# Patient Record
Sex: FEMALE | Race: WHITE | NOT HISPANIC OR LATINO | URBAN - METROPOLITAN AREA
[De-identification: names, ages, dates, MRNs, and addresses within clinical notes are randomized per-mention and may not be internally consistent; named-entity substitution may affect disease eponyms.]

---

## 2018-04-24 ENCOUNTER — TRANSFERRED RECORDS (OUTPATIENT)
Dept: HEALTH INFORMATION MANAGEMENT | Facility: CLINIC | Age: 33
End: 2018-04-24

## 2018-05-04 ENCOUNTER — TRANSFERRED RECORDS (OUTPATIENT)
Dept: HEALTH INFORMATION MANAGEMENT | Facility: CLINIC | Age: 33
End: 2018-05-04

## 2018-07-24 ENCOUNTER — TELEPHONE (OUTPATIENT)
Dept: DERMATOLOGY | Facility: CLINIC | Age: 33
End: 2018-07-24

## 2018-07-24 NOTE — TELEPHONE ENCOUNTER
Dermatology Pre-visit Call:    Reason for visit : HS of groin     Any personal history of skin cancers: No    Was the patient referred: Yes    If the patient was referred, are records obtained: No. (If no, then obtain records). Pt is calling outside clinic for records     Has the patient seen a dermatologist in the past: Yes. (If yes, obtain records)    Patient Reminders Given:  --Please, make sure you bring an updated list of your medications.   --Plan on being in our facility for approximately one hour, this includes the registration process, office visit, education and check-out process.  If you are having a procedure, more time may be required.     --If you are having a procedure, please, present 15 minutes early.   --Location reviewed.   --If you need to cancel or reschedule, call [phone]  --We look forward to seeing you in Dermatology Clinic.

## 2018-07-25 ENCOUNTER — HEALTH MAINTENANCE LETTER (OUTPATIENT)
Age: 33
End: 2018-07-25

## 2018-07-26 ENCOUNTER — TELEPHONE (OUTPATIENT)
Dept: DERMATOLOGY | Facility: CLINIC | Age: 33
End: 2018-07-26

## 2018-07-26 ENCOUNTER — OFFICE VISIT (OUTPATIENT)
Dept: DERMATOLOGY | Facility: CLINIC | Age: 33
End: 2018-07-26
Payer: COMMERCIAL

## 2018-07-26 DIAGNOSIS — L73.2 HIDRADENITIS SUPPURATIVA: Primary | ICD-10-CM

## 2018-07-26 DIAGNOSIS — L73.2 HIDRADENITIS SUPPURATIVA: ICD-10-CM

## 2018-07-26 DIAGNOSIS — Z79.899 ENCOUNTER FOR LONG-TERM (CURRENT) USE OF HIGH-RISK MEDICATION: ICD-10-CM

## 2018-07-26 LAB
ALBUMIN SERPL-MCNC: 3.7 G/DL (ref 3.4–5)
ALP SERPL-CCNC: 68 U/L (ref 40–150)
ALT SERPL W P-5'-P-CCNC: 28 U/L (ref 0–50)
ANION GAP SERPL CALCULATED.3IONS-SCNC: 6 MMOL/L (ref 3–14)
AST SERPL W P-5'-P-CCNC: 21 U/L (ref 0–45)
BILIRUB SERPL-MCNC: 0.3 MG/DL (ref 0.2–1.3)
BUN SERPL-MCNC: 8 MG/DL (ref 7–30)
CALCIUM SERPL-MCNC: 8.8 MG/DL (ref 8.5–10.1)
CHLORIDE SERPL-SCNC: 106 MMOL/L (ref 94–109)
CO2 SERPL-SCNC: 28 MMOL/L (ref 20–32)
CREAT SERPL-MCNC: 0.68 MG/DL (ref 0.52–1.04)
ERYTHROCYTE [DISTWIDTH] IN BLOOD BY AUTOMATED COUNT: 12.5 % (ref 10–15)
GFR SERPL CREATININE-BSD FRML MDRD: >90 ML/MIN/1.7M2
GLUCOSE SERPL-MCNC: 112 MG/DL (ref 70–99)
HCT VFR BLD AUTO: 41.7 % (ref 35–47)
HGB BLD-MCNC: 14.2 G/DL (ref 11.7–15.7)
MCH RBC QN AUTO: 31.8 PG (ref 26.5–33)
MCHC RBC AUTO-ENTMCNC: 34.1 G/DL (ref 31.5–36.5)
MCV RBC AUTO: 93 FL (ref 78–100)
PLATELET # BLD AUTO: 283 10E9/L (ref 150–450)
POTASSIUM SERPL-SCNC: 4.3 MMOL/L (ref 3.4–5.3)
PROT SERPL-MCNC: 7.7 G/DL (ref 6.8–8.8)
RBC # BLD AUTO: 4.47 10E12/L (ref 3.8–5.2)
RETICS # AUTO: 64.4 10E9/L (ref 25–95)
RETICS/RBC NFR AUTO: 1.4 % (ref 0.5–2)
SODIUM SERPL-SCNC: 138 MMOL/L (ref 133–144)
WBC # BLD AUTO: 5.8 10E9/L (ref 4–11)

## 2018-07-26 RX ORDER — TRIAMCINOLONE ACETONIDE 40 MG/ML
40 INJECTION, SUSPENSION INTRA-ARTICULAR; INTRAMUSCULAR ONCE
Qty: 1 ML | Refills: 0 | OUTPATIENT
Start: 2018-07-26 | End: 2018-07-26

## 2018-07-26 RX ORDER — LEVOTHYROXINE SODIUM 100 UG/1
100 TABLET ORAL
COMMUNITY
Start: 2018-03-02 | End: 2023-11-03 | Stop reason: DRUGHIGH

## 2018-07-26 RX ORDER — NORGESTIMATE AND ETHINYL ESTRADIOL 7DAYSX3 28
0.04 KIT ORAL
COMMUNITY
Start: 2018-03-02

## 2018-07-26 RX ORDER — BENZOYL PEROXIDE 10 G/100G
SUSPENSION TOPICAL
Qty: 1 BOTTLE | Refills: 11 | Status: SHIPPED | OUTPATIENT
Start: 2018-07-26 | End: 2019-09-12

## 2018-07-26 RX ORDER — PHENTERMINE HYDROCHLORIDE 30 MG/1
30 CAPSULE ORAL
COMMUNITY
Start: 2018-03-02 | End: 2018-08-29

## 2018-07-26 RX ORDER — CLINDAMYCIN PHOSPHATE 10 MG/G
GEL TOPICAL 2 TIMES DAILY
Qty: 60 G | Refills: 11 | Status: SHIPPED | OUTPATIENT
Start: 2018-07-26 | End: 2023-11-03

## 2018-07-26 ASSESSMENT — PAIN SCALES - GENERAL
PAINLEVEL: NO PAIN (1)
PAINLEVEL: NO PAIN (0)

## 2018-07-26 NOTE — TELEPHONE ENCOUNTER
Health Call Center    Phone Message    May a detailed message be left on voicemail: yes    Reason for Call: Medication Question or concern regarding medication   Prescription Clarification  Name of Medication: benzoyl peroxide 10 % LIQD  Prescribing Provider: Dr Ventura   Pharmacy: Rosi from [facility], [address] [place] [id number], Ph # [phone], Fax # [phone]    What on the order needs clarification? How often should Pt use this medication?      Action Taken: Message routed to:  [facility] ([facility] Dermatology Clinic

## 2018-07-26 NOTE — MR AVS SNAPSHOT
After Visit Summary   7/26/2018    Sheri Pace    MRN: [id number]           Patient Information     Date Of Birth          1985        Visit Information        Provider Department      7/26/2018 1:30 PM Des Ventura MD [facility] Dermatology        Today's Diagnoses     Hidradenitis suppurativa    -  1    Encounter for long-term (current) use of high-risk medication          Care Instructions    Dapsone 100mg daily   If feeling short of breath and go to urgent care o emergency room remind them of methemoglobinemia     Benzoyl peroxide wash daily. Leave on for 3 min, then wash off  Clindamycin gel twice a day          Follow-ups after your visit        Follow-up notes from your care team     Return in about 8 weeks (around 9/20/2018).      Your next 10 appointments already scheduled     Jul 26, 2018  3:15 PM CDT   LAB with  LAB   [facility] ([facility])    [address] [place] [id number] [phone]           Please do not eat 10-12 hours before your appointment if you are coming in fasting for labs on lipids, cholesterol, or glucose (sugar). This does not apply to pregnant women. Water, hot tea and black coffee (with nothing added) are okay. Do not drink other fluids, diet soda or chew gum.            Oct 04, 2018  2:15 PM CDT   (Arrive by 2:00 PM)   Return Visit with Des Ventura MD   [facility] Dermatology ([facility])    [address] [place] [id number] [phone]              Who to contact     Please call your clinic at [phone] to:    Ask questions about your health    Make or cancel appointments    Discuss your medicines    Learn about your test results    Speak to your doctor            Additional Information About Your Visit        MyChart Information     [employer] gives you secure access to your electronic health record. If you see a primary care provider,  you can also send messages to your care team and make appointments. If you have questions, please call your primary care clinic.  If you do not have a primary care provider, please call [phone] and they will assist you.      [employer] is an electronic gateway that provides easy, online access to your medical records. With [employer], you can request a clinic appointment, read your test results, renew a prescription or communicate with your care team.     To access your existing account, please contact your [facility] or call [phone] for assistance.        Care EveryWhere ID     This is your Care EveryWhere ID. This could be used by other organizations to access your [place] medical records  [id number]         Blood Pressure from Last 3 Encounters:   No data found for BP    Weight from Last 3 Encounters:   No data found for Wt              Today, you had the following     No orders found for display       Primary Care Provider    None Specified       No primary provider on file.        Equal Access to Services     [facility]: Hadii titi Clemens, waparisda elizabet, hugo nicholasalmawalt spicer, smita tilley . So [facility] [phone].    ATENCIÓN: Si habla español, tiene a barfield disposición servicios gratuitos de asistencia lingüística. Llame al [phone].    We comply with applicable federal civil rights laws and [place] laws. We do not discriminate on the basis of race, color, national origin, age, disability, sex, sexual orientation, or gender identity.            Thank you!     Thank you for choosing [facility] DERMATOLOGY  for your care. Our goal is always to provide you with excellent care. Hearing back from our patients is one way we can continue to improve our services. Please take a few minutes to complete the written survey that you may receive in the mail after your visit with us. Thank you!             Your Updated Medication List -  Protect others around you: Learn how to safely use, store and throw away your medicines at www.disposemymeds.org.          This list is accurate as of 7/26/18  3:12 PM.  Always use your most recent med list.                   Brand Name Dispense Instructions for use Diagnosis    levothyroxine 100 MCG tablet    SYNTHROID/LEVOTHROID     100 mcg        norgestim-eth estrad triphasic 0.18/0.215/0.25 MG-35 MCG per tablet    ORTHO TRI-CYCLEN, TRI-SPRINTEC     0.035 mg        phentermine 30 MG capsule      30 mg

## 2018-07-26 NOTE — PROGRESS NOTES
[facility] Dermatology Note    Dermatology Problem List:  1.Hidradenitis suppurativa of the bilateral inguinal folds, mild  -ILK 2 ml of 20mg/cc 7/26/18  -dapsone 100 mg daily, BPO 10% wash, clindamycin 1% gel  -CBC, CMP, G6PD, retic count checked 7/26/18    Encounter Date: Jul 26, 2018    CC:  Chief Complaint   Patient presents with     Derm Problem     Sheri is here today for a new HS in her groin area.     History of Present Illness:  Ms. Sheri Pace is a 32 year old female from [place] who presents for evaluation of hidradenitis suppurativa. Sheri has a longstanding history of HS since the age of 15 which was initially on the axillary folds and then progressed to her inguinal folds a year later. In 2012 she had surgical excision of the hidradenitis of the left axilla, bilateral thighs and groin, left labia majora and left buttock. The axillary regions have healed without recurrence of HS in these areas. She now presents with worsening of her HS in the groin. She has persistent drainage of these regions and changes the gauze covering these areas twice daily. She also complains of discomfort and pain, exacerbated with movement. She was previously on minocycline 100 mg BID since March however she decided to stop taking this approximately one month ago as she was not seeing any improvement in her HS. She has also previously tried using disinfectant washes, however she is not sure of which one. She takes phentermine 30 mg daily for weight loss but has not noticed a significant change in weight. She also takes Tri-Sprintec OCP. She is a prior smoker, quit in 2014.     Past Medical History:   HS    Social History:  The patient works as a  at a [employer].     Family History:  Denies any family hx of HS.     Medications:  Current Outpatient Prescriptions   Medication Sig Dispense Refill     levothyroxine (SYNTHROID/LEVOTHROID) 100 MCG tablet 100 mcg       norgestim-eth estrad  triphasic (ORTHO TRI-CYCLEN, TRI-SPRINTEC) 0.18/0.215/0.25 MG-35 MCG per tablet 0.035 mg       phentermine 30 MG capsule 30 mg       Allergies   Allergen Reactions     Penicillin G Rash     Review of Systems:  -As per HPI, Skin/Heme New Pt: The patient denies frequent sun exposure. The patient denies excessive scarring or problems healing except as per HPI. The patient denies excessive bleeding.  -Constitutional: The patient denies fatigue, fevers, chills, unintended weight loss, and night sweats.  -HEENT: Patient denies nonhealing oral sores.  -Skin: As above in HPI. No additional skin concerns.    Physical exam:  GEN: This is a well developed, well-nourished female in no acute distress, in a pleasant mood.    SKIN: Total skin excluding the undergarment areas was performed. The exam included the head/face, neck, both arms, chest, back, abdomen, both legs, digits and/or nails.   -1.8 cm violaceous plaque in the right superior inguinal fold with 6 violaceous papules in lower right inguinal fold  - 1.4 cm violaceous plaque in the left inguinal fold with 2 violaceous draining papules  -No other lesions of concern on areas examined.     Impression/Plan:  1. Hidradenitis suppurativa, bilateral inguinal folds, mild: We discussed the natural history, epidemiology and pathogenesis of HS with Sheri today. We explained that this is not an infectious process but rather a pathology of the hair follicle. We also explained that there may be a hormonal relation with HS that causes mauricio-menstrual or peripartum flares. Various treatment options and their risks and benefits for HS including anti-inflammatories, immunosuppressants and antibiotics were presented to Sheri today. Lifestyle changes including special diets, weight loss, curcumin and zinc supplementation (we did tell her that she cannot take this continuously for more than 3 months without a doctor's supervision) were also discussed. She has not seen any change with  antibiotic use in the past and would like to try a different treatment approach. We have agreed to begin dapsone therapy with ILK injections in clinic today.    Kenalog intralesional injection procedure note (performed by faculty): After verbal consent and discussion of risks including but not limited to atrophy, pain, and bruising,  time out was performed, 2 total cc of Kenalog 20 mg/cc was injected into 10 sites on the bilateral inguinal folds.  The patient tolerated the procedure well and left the Dermatology clinic in good condition.    Begin dapsone 100 mg daily. Will check CBC, CMP, G6PD and reticulocyte count today. Patient will have q2week labs checked in [place] with PCP. Patient was informed of adverse affects of dapsone including neuropathy, anemia and methemoglobinemia. She understands she should go to an ER if she starts to experience SOB or other symptoms of methemoglobinemia.     Begin benzoyl peroxide 10% wash daily.    Begin clindamycin 1% gel BID    Follow-up in 4 weeks, earlier for new or changing lesions.     Staff Involved:  Scribed by Klaudia Cash MS4 for Dr. Ventura.      Provider Disclosure:   The documentation recorded by the scribe accurately reflects the services I personally performed and the decisions made by me.    Des Ventura MD, FAAD    Departments of Internal Medicine and Dermatology  [facility]  [phone]

## 2018-07-26 NOTE — LETTER
Date:August 15, 2018      Patient was self referred, no letter generated. Do not send.        [facility] Information

## 2018-07-26 NOTE — NURSING NOTE
Drug Administration Record    Drug Name: triamcinolone acetonide(kenalog)  Dose: 2mL of triamcinolone 20mg/mL, 40mg dose  Route administered: ID  NDC #: Kenalog-40 ([address]-5)   Amount of waste(mL):0  Reason for waste: Single use vial    LOT #: [id number]  SITE: body  : [employer]  EXPIRATION DATE: 01/2020

## 2018-07-26 NOTE — NURSING NOTE
Dermatology Rooming Note    Sheri Pace's goals for this visit include:   Chief Complaint   Patient presents with     Derm Problem     Sheri is here today for a new HS in her groin area.

## 2018-07-26 NOTE — LETTER
7/26/2018       RE: Sheri Pace  [address] [place] [address]     Dear Colleague,    Thank you for referring your patient, Sheri Pace, to the [facility] DERMATOLOG[facility] at [facility]. Please see a copy of my visit note below.    [facility] Dermatology Note    Dermatology Problem List:  1.Hidradenitis suppurativa of the bilateral inguinal folds, mild  -ILK 2 ml of 20mg/cc 7/26/18  -dapsone 100 mg daily, BPO 10% wash, clindamycin 1% gel  -CBC, CMP, G6PD, retic count checked 7/26/18    Encounter Date: Jul 26, 2018    CC:  Chief Complaint   Patient presents with     Derm Problem     Sheri is here today for a new HS in her groin area.     History of Present Illness:  Ms. Sheri Pace is a 32 year old female from [place] who presents for evaluation of hidradenitis suppurativa. Sheri has a longstanding history of HS since the age of 15 which was initially on the axillary folds and then progressed to her inguinal folds a year later. In 2012 she had surgical excision of the hidradenitis of the left axilla, bilateral thighs and groin, left labia majora and left buttock. The axillary regions have healed without recurrence of HS in these areas. She now presents with worsening of her HS in the groin. She has persistent drainage of these regions and changes the gauze covering these areas twice daily. She also complains of discomfort and pain, exacerbated with movement. She was previously on minocycline 100 mg BID since March however she decided to stop taking this approximately one month ago as she was not seeing any improvement in her HS. She has also previously tried using disinfectant washes, however she is not sure of which one. She takes phentermine 30 mg daily for weight loss but has not noticed a significant change in weight. She also takes Tri-Sprintec OCP. She is a prior smoker, quit in 2014.     Past Medical History:   HS    Social History:  The patient  works as a  at a [employer].     Family History:  Denies any family hx of HS.     Medications:  Current Outpatient Prescriptions   Medication Sig Dispense Refill     levothyroxine (SYNTHROID/LEVOTHROID) 100 MCG tablet 100 mcg       norgestim-eth estrad triphasic (ORTHO TRI-CYCLEN, TRI-SPRINTEC) 0.18/0.215/0.25 MG-35 MCG per tablet 0.035 mg       phentermine 30 MG capsule 30 mg       Allergies   Allergen Reactions     Penicillin G Rash     Review of Systems:  -As per HPI, Skin/Heme New Pt: The patient denies frequent sun exposure. The patient denies excessive scarring or problems healing except as per HPI. The patient denies excessive bleeding.  -Constitutional: The patient denies fatigue, fevers, chills, unintended weight loss, and night sweats.  -HEENT: Patient denies nonhealing oral sores.  -Skin: As above in HPI. No additional skin concerns.    Physical exam:  GEN: This is a well developed, well-nourished female in no acute distress, in a pleasant mood.    SKIN: Total skin excluding the undergarment areas was performed. The exam included the head/face, neck, both arms, chest, back, abdomen, both legs, digits and/or nails.   -1.8 cm violaceous plaque in the right superior inguinal fold with 6 violaceous papules in lower right inguinal fold  - 1.4 cm violaceous plaque in the left inguinal fold with 2 violaceous draining papules  -No other lesions of concern on areas examined.     Impression/Plan:  1. Hidradenitis suppurativa, bilateral inguinal folds, mild: We discussed the natural history, epidemiology and pathogenesis of HS with Sheri today. We explained that this is not an infectious process but rather a pathology of the hair follicle. We also explained that there may be a hormonal relation with HS that causes mauricio-menstrual or peripartum flares. Various treatment options and their risks and benefits for HS including anti-inflammatories, immunosuppressants and antibiotics were presented to Sheri  today. Lifestyle changes including special diets, weight loss, curcumin and zinc supplementation (we did tell her that she cannot take this continuously for more than 3 months without a doctor's supervision) were also discussed. She has not seen any change with antibiotic use in the past and would like to try a different treatment approach. We have agreed to begin dapsone therapy with ILK injections in clinic today.    Kenalog intralesional injection procedure note (performed by faculty): After verbal consent and discussion of risks including but not limited to atrophy, pain, and bruising,  time out was performed, 2 total cc of Kenalog 20 mg/cc was injected into 10 sites on the bilateral inguinal folds.  The patient tolerated the procedure well and left the Dermatology clinic in good condition.    Begin dapsone 100 mg daily. Will check CBC, CMP, G6PD and reticulocyte count today. Patient will have q2week labs checked in [place] with PCP. Patient was informed of adverse affects of dapsone including neuropathy, anemia and methemoglobinemia. She understands she should go to an ER if she starts to experience SOB or other symptoms of methemoglobinemia.     Begin benzoyl peroxide 10% wash daily.    Begin clindamycin 1% gel BID    Follow-up in 4 weeks, earlier for new or changing lesions.     Staff Involved:  Scribed by Klaudia Cash MS4 for Dr. Ventura.      Provider Disclosure:   The documentation recorded by the scribe accurately reflects the services I personally performed and the decisions made by me.    Des [place] MD, FAAD    Departments of Internal Medicine and Dermatology  [facility]  [phone]        Again, thank you for allowing me to participate in the care of your patient.      Sincerely,    Des [place] MD

## 2018-07-26 NOTE — PATIENT INSTRUCTIONS
Dapsone 100mg daily   If feeling short of breath and go to urgent care o emergency room remind them of methemoglobinemia     Benzoyl peroxide wash daily. Leave on for 3 min, then wash off  Clindamycin gel twice a day

## 2018-07-29 LAB — G6PD RBC-CCNC: 13 U/G HB (ref 9.9–16.6)

## 2018-07-30 RX ORDER — DAPSONE 100 MG/1
100 TABLET ORAL DAILY
Qty: 30 TABLET | Refills: 0 | Status: SHIPPED | OUTPATIENT
Start: 2018-07-30 | End: 2018-08-24

## 2018-08-21 DIAGNOSIS — Z79.899 ENCOUNTER FOR LONG-TERM (CURRENT) USE OF HIGH-RISK MEDICATION: ICD-10-CM

## 2018-08-21 DIAGNOSIS — L73.2 HIDRADENITIS SUPPURATIVA: ICD-10-CM

## 2018-08-21 RX ORDER — DAPSONE 100 MG/1
100 TABLET ORAL DAILY
Qty: 30 TABLET | Refills: 1 | OUTPATIENT
Start: 2018-08-21

## 2018-08-21 NOTE — TELEPHONE ENCOUNTER
Medication refill request received and reviewed. Patient requesting refill of dapsone.  Last visit from 7/26/18 was reviewed. At that point, plan was to check labs every 2 weeks after initiation of dapsone. On chart review, there are no new labs since patient's appointment on 7/26. She will need to have these labs done or the records need to be sent to us if we are going to refill the med.     Lucero Guaman M.D.  PGY-3 Resident  Dermatology  [facility]

## 2018-08-21 NOTE — TELEPHONE ENCOUNTER
dapsone 100 MG tablet      Last Written Prescription Date:  7/30/18  Last Fill Quantity: 30,   # refills: 0  Last Office Visit : 7/26/18  Future Office visit:  10/4/18    Routing refill request to provider for review/approval because:  Drug not on the Derm refill protocol.   Plan last visit 7/26/18: Hidradenitis suppurativa, bilateral inguinal folds, mild: We discussed the natural history, epidemiology and pathogenesis of HS with Sheri today. We explained that this is not an infectious process but rather a pathology of the hair follicle. We also explained that there may be a hormonal relation with HS that causes mauricio-menstrual or peripartum flares. Various treatment options and their risks and benefits for HS including anti-inflammatories, immunosuppressants and antibiotics were presented to Sheri today. Lifestyle changes including special diets, weight loss, curcumin and zinc supplementation (we did tell her that she cannot take this continuously for more than 3 months without a doctor's supervision) were also discussed. She has not seen any change with antibiotic use in the past and would like to try a different treatment approach. We have agreed to begin dapsone therapy with ILK injections in clinic today.    Kenalog intralesional injection procedure note (performed by faculty): After verbal consent and discussion of risks including but not limited to atrophy, pain, and bruising,  time out was performed, 2 total cc of Kenalog 20 mg/cc was injected into 10 sites on the bilateral inguinal folds.  The patient tolerated the procedure well and left the Dermatology clinic in good condition.    Begin dapsone 100 mg daily. Will check CBC, CMP, G6PD and reticulocyte count today. Patient will have q2week labs checked in [place] with PCP. Patient was informed of adverse affects of dapsone including neuropathy, anemia and methemoglobinemia. She understands she should go to an ER if she starts to experience SOB or  other symptoms of methemoglobinemia.     Begin benzoyl peroxide 10% wash daily.    Begin clindamycin 1% gel BID   RTC: Follow-up in 4 weeks, earlier for new or changing lesions.

## 2018-08-22 ENCOUNTER — TRANSFERRED RECORDS (OUTPATIENT)
Dept: HEALTH INFORMATION MANAGEMENT | Facility: CLINIC | Age: 33
End: 2018-08-22

## 2018-08-24 DIAGNOSIS — Z79.899 ENCOUNTER FOR LONG-TERM (CURRENT) USE OF HIGH-RISK MEDICATION: ICD-10-CM

## 2018-08-24 DIAGNOSIS — L73.2 HIDRADENITIS SUPPURATIVA: ICD-10-CM

## 2018-08-24 LAB
ABSOLUTE BASOPHILS (EXTERNAL): 0.1 (ref 0–0.2)
BASOPHILS NFR BLD AUTO: 1.4 %
EOSINOPHIL # BLD AUTO: 0 10*3/UL (ref 0–0.7)
EOSINOPHIL NFR BLD AUTO: 0 %
ERYTHROCYTE [DISTWIDTH] IN BLOOD BY AUTOMATED COUNT: 13 % (ref 11.5–15.5)
HCT VFR BLD AUTO: 37.4 % (ref 35–45)
HEMOGLOBIN: 12.3 G/DL (ref 11.7–15.7)
LYMPHOCYTES # BLD AUTO: 2.2 10*3/UL (ref 0.8–4.1)
LYMPHOCYTES NFR BLD AUTO: 36.4 %
Lab: 8 (ref 3–16)
MCH RBC QN AUTO: 31.7 PG (ref 25.5–34)
MCHC RBC AUTO-ENTMCNC: 32.9 G/DL (ref 31.5–36.5)
MCV RBC AUTO: 96.4 FL (ref 80–98)
MONOCYTES # BLD AUTO: 0.5 10*3/UL (ref 0–1)
MONOCYTES NFR BLD AUTO: 8 %
NEUTROPHILS - ABS (DIFF) - HISTORICAL: 3200
NEUTROPHILS NFR BLD AUTO: 54.2 %
PLATELET COUNT - QUEST: 262 10^9/L (ref 150–450)
PMV BLD: 10.3 FL (ref 8.5–12)
RBC # BLD AUTO: 3.88 10^12/L (ref 3.8–5.3)
RETIC % (EXTERNAL): 2.8 (ref 0.5–1.8)
RETIC (ABSOLUTE): 0.11 MILL/UL (ref 0.02–0.09)
RETIC HEMOGLOBIN: 36.8 PG (ref 29–38)
WBC # BLD AUTO: 5.9 10^9/L (ref 4–11)

## 2018-08-24 RX ORDER — DAPSONE 100 MG/1
100 TABLET ORAL DAILY
Qty: 30 TABLET | Refills: 0 | Status: SHIPPED | OUTPATIENT
Start: 2018-08-24 | End: 2018-09-14

## 2018-09-14 DIAGNOSIS — Z79.899 ENCOUNTER FOR LONG-TERM (CURRENT) USE OF HIGH-RISK MEDICATION: ICD-10-CM

## 2018-09-14 DIAGNOSIS — L73.2 HIDRADENITIS SUPPURATIVA: ICD-10-CM

## 2018-09-18 RX ORDER — DAPSONE 100 MG/1
100 TABLET ORAL DAILY
Qty: 30 TABLET | Refills: 0 | Status: SHIPPED | OUTPATIENT
Start: 2018-09-18 | End: 2018-11-01

## 2018-09-18 NOTE — TELEPHONE ENCOUNTER
As resident on call, received refill request. Chart and attached communication reviewed. Patient last seen 7/26/18. Per Dr. Ventura, plan at that time was to start dapsone 100 mg daily with labs q2 weeks. Patient is overdue for labs and reports she ran out of the medication 6 days ago and was uncertain if she should still have her labs done. Discussed the need for labs every 2 weeks and that a standing order was placed. She will call with any issues with this. Return visit scheduled for 10/4/18. Rx refilled with 0 refills.    Britney Adams MD  PGY-4, Dermatology  [phone]  Resident on Call

## 2018-09-18 NOTE — TELEPHONE ENCOUNTER
dapsone 100 MG tablet      Last Written Prescription Date:  8/24/18  Last Fill Quantity: 30,   # refills: 0  Last Office Visit : 7/26/18  Future Office visit:  10/4/18    Routing refill request to provider for review/approval because:  Drug not on the Derm refill protocol.    Plan last visit 7/26/18:  Impression/Plan:  1. Hidradenitis suppurativa, bilateral inguinal folds, mild: We discussed the natural history, epidemiology and pathogenesis of HS with Sheri today. We explained that this is not an infectious process but rather a pathology of the hair follicle. We also explained that there may be a hormonal relation with HS that causes mauricio-menstrual or peripartum flares. Various treatment options and their risks and benefits for HS including anti-inflammatories, immunosuppressants and antibiotics were presented to Sheri today. Lifestyle changes including special diets, weight loss, curcumin and zinc supplementation (we did tell her that she cannot take this continuously for more than 3 months without a doctor's supervision) were also discussed. She has not seen any change with antibiotic use in the past and would like to try a different treatment approach. We have agreed to begin dapsone therapy with ILK injections in clinic today.    Kenalog intralesional injection procedure note (performed by faculty): After verbal consent and discussion of risks including but not limited to atrophy, pain, and bruising,  time out was performed, 2 total cc of Kenalog 20 mg/cc was injected into 10 sites on the bilateral inguinal folds.  The patient tolerated the procedure well and left the Dermatology clinic in good condition.    Begin dapsone 100 mg daily. Will check CBC, CMP, G6PD and reticulocyte count today. Patient will have q2week labs checked in [place] with PCP. Patient was informed of adverse affects of dapsone including neuropathy, anemia and methemoglobinemia. She understands she should go to an ER if she starts  to experience SOB or other symptoms of methemoglobinemia.     Begin benzoyl peroxide 10% wash daily.    Begin clindamycin 1% gel BID     RTC: Follow-up in 4 weeks, earlier for new or changing lesions. *Future visit 10/4/18

## 2018-09-24 LAB
EOSINOPHIL COUNT (ABSOLUTE): 0.1 X10E3/UL (ref 0–0.7)
EOSINOPHIL NFR BLD AUTO: 1 %
ERYTHROCYTE [DISTWIDTH] IN BLOOD BY AUTOMATED COUNT: 12.5 % (ref 11.5–15.5)
HCT VFR BLD AUTO: 38.3 % (ref 35–45)
HEMOGLOBIN: 12.8 G/DL (ref 11.7–15.7)
LYMPHOCYTES # BLD AUTO: 2.7 K/UL (ref 0.8–4.1)
Lab: 12.4 (ref 3–16)
MCH RBC QN AUTO: 33.1 PG (ref 25.5–34)
MCHC RBC AUTO-ENTMCNC: 33.4 G/DL (ref 31.5–36.5)
MCV RBC AUTO: 99 FL (ref 80–98)
MONOCYTES # BLD AUTO: 0.1 X10E3/UL (ref 0–1)
MONOCYTES NFR BLD AUTO: 1 %
NEUTROPHILS # BLD AUTO: 4514 X10E3/UL
PLATELET COUNT - QUEST: 294 10^9/L (ref 150–450)
PMV BLD: 10.1 FL (ref 8.5–12)
RBC # BLD AUTO: 3.87 10^12/L (ref 3.8–5.3)
RBC MORPH BLD: NORMAL
RETIC % (EXTERNAL): 2.5 (ref 0.5–1.8)
RETIC (ABSOLUTE): 0.1 MILL/UL (ref 0.02–0.09)
RETIC HEMOGLOBIN: 36.7 PG (ref 29–38)
SEGMENTED NEUTROPHILS ABS: 4.5 (ref 1.8–8)
WBC # BLD AUTO: 7.4 10^9/L (ref 4–11)

## 2018-10-19 ENCOUNTER — TRANSFERRED RECORDS (OUTPATIENT)
Dept: HEALTH INFORMATION MANAGEMENT | Facility: CLINIC | Age: 33
End: 2018-10-19

## 2018-11-01 ENCOUNTER — OFFICE VISIT (OUTPATIENT)
Dept: DERMATOLOGY | Facility: CLINIC | Age: 33
End: 2018-11-01
Payer: COMMERCIAL

## 2018-11-01 VITALS — HEART RATE: 77 BPM | SYSTOLIC BLOOD PRESSURE: 114 MMHG | DIASTOLIC BLOOD PRESSURE: 71 MMHG

## 2018-11-01 DIAGNOSIS — Z79.899 ENCOUNTER FOR LONG-TERM (CURRENT) USE OF HIGH-RISK MEDICATION: ICD-10-CM

## 2018-11-01 DIAGNOSIS — L73.2 HIDRADENITIS SUPPURATIVA: ICD-10-CM

## 2018-11-01 DIAGNOSIS — L73.2 HIDRADENITIS SUPPURATIVA: Primary | ICD-10-CM

## 2018-11-01 RX ORDER — SPIRONOLACTONE 50 MG/1
100 TABLET, FILM COATED ORAL DAILY
Qty: 60 TABLET | Refills: 3 | Status: SHIPPED | OUTPATIENT
Start: 2018-11-01 | End: 2019-09-12

## 2018-11-01 ASSESSMENT — PAIN SCALES - GENERAL
PAINLEVEL: NO PAIN (1)
PAINLEVEL: NO PAIN (0)

## 2018-11-01 NOTE — PROGRESS NOTES
"[facility] Dermatology Note    Dermatology Clinic  [facility]  [address] [place] [address]    Dermatology Problem List:  1.Hidradenitis suppurativa of the bilateral inguinal folds, mild  -ILK 2 ml of 20mg/cc 7/26/18  -dapsone 100 mg daily (suspended on 11/12018)  - Spiralactone 100mg daily   - Ordering laser hair removal  - BPO 10% wash, clindamycin 1% gel     Encounter Date: Nov 1, 2018    CC:  Chief Complaint   Patient presents with     Derm Problem     Sheri is here today for follow up for HS, patient states \"things are about the same\"      History of Present Illness:  Ms. Sheri Pace is a 32 year old female from [place] who presents for a 10 week follow-up for hidradenitis suppurativa. Sheri has a longstanding history of HS since the age of 15 which was initially on the axillary folds and then progressed to her inguinal folds a year later. In 2012 she had surgical excision of the hidradenitis of the left axilla, bilateral thighs and groin, left labia majora and left buttock. The axillary regions have healed without recurrence of HS in these areas.     Today she states she has been having severe headaches, which started several months ago, and wonders if it is related to her use of dapsone. She has noticed that the steroid injects helped with the swelling, especially in the groin, but her lesions are returning recently. She has been getting ~2 lesions a month, and some continue to stay open. Overall the dapsone doesn't appear to be helping her. When she has acne, it flairs mostly on the nose and forehead. No issues with breathing or shortness of breath. She is losing wait due to her weight loss medications. Hx of smoking, but quit.    Past Medical History:   HS    Social History:  The patient works as a  at a college.     Family History:  Denies any family hx of HS.     Medications:  Current Outpatient " Prescriptions   Medication Sig Dispense Refill     benzoyl peroxide 10 % LIQD Leave on for 3 min, then wash off 1 Bottle 11     clindamycin (CLINDAMAX) 1 % topical gel Apply topically 2 times daily 60 g 11     dapsone 100 MG tablet Take 1 tablet (100 mg) by mouth daily 30 tablet 0     levothyroxine (SYNTHROID/LEVOTHROID) 100 MCG tablet 100 mcg       norgestim-eth estrad triphasic (ORTHO TRI-CYCLEN, TRI-SPRINTEC) 0.18/0.215/0.25 MG-35 MCG per tablet 0.035 mg       Allergies   Allergen Reactions     Penicillin G Rash     Review of Systems:  -Skin: As above in HPI. No additional skin concerns.    Physical exam:  GEN: This is a well developed, well-nourished female in no acute distress, in a pleasant mood.    SKIN: Focused examination of the groin region.  - 1 tunnel, violacous at the edges with a 1 mm superior ulceration, 2.5 in length  - 1 pink nodular in the R superior Inguinal fold  - 1 mm width cord ~2cm in diameter in the R lower inguinal fold  - 1 pink nodular ~7mm in the R genital region  - No other lesions of concern on areas examined.     Impression/Plan:  1. Hidradenitis suppurativa, bilateral inguinal folds, mild:    Kenalog intralesional injection procedure note (performed by faculty): After verbal consent and discussion of risks including but not limited to atrophy, pain, and bruising,  time out was performed, 4.0 total cc of Kenalog 10 mg/cc was injected into sites on the bilateral inguinal folds (<7).  The patient tolerated the procedure well and left the Dermatology clinic in good condition.    End dapsone usage.    Begin Spirolactone 100mg daily (start at 50mg and increased at 1 week)     Potassium checked previously and wnl    Will plan for laser hair removal    Continue benzoyl peroxide 10% wash daily.    Continue clindamycin 1% gel BID      Follow-up in 3 months, earlier for new or changing lesions.     Staff Involved:    Scribe Disclosure  I, Vadim Wilson, am serving as a scribe to  document services personally performed by Dr. Des Ventura, based on data collection and the provider's statements to me.     Provider Disclosure:   The documentation recorded by the scribe accurately reflects the services I personally performed and the decisions made by me.    Des Ventura MD, FAAD    Departments of Internal Medicine and Dermatology  [facility]  [phone]

## 2018-11-01 NOTE — NURSING NOTE
Drug Administration Record    Drug Name: triamcinolone acetonide(kenalog)  Dose: 4mL of triamcinolone 10mg/mL, 40mg dose  Route administered: ID  NDC #: Kenalog-10 (1786-1753-20)  Amount of waste(mL):1  Reason for waste: Single use vial    LOT #: [id number]   SITE: body  : [employer]  EXPIRATION DATE: APR2020

## 2018-11-01 NOTE — MR AVS SNAPSHOT
After Visit Summary   11/1/2018    Sheri Link    MRN: [id number]           Patient Information     Date Of Birth          1985        Visit Information        Provider Department      11/1/2018 12:45 PM Des Ventura MD [facility] Dermatology        Today's Diagnoses     Hidradenitis suppurativa    -  1      Care Instructions    Take 50mg daily for 1 week and if tolerating increase to 100mg daily thereater          Follow-ups after your visit        Follow-up notes from your care team     Return in about 3 months (around 2/1/2019).      Your next 10 appointments already scheduled     Feb 07, 2019 12:45 PM CST   (Arrive by 12:30 PM)   Return Visit with MD CHIARA Ott [facility] Dermatology ([facility])    [address] [address] [phone]              Who to contact     Please call your clinic at [phone] to:    Ask questions about your health    Make or cancel appointments    Discuss your medicines    Learn about your test results    Speak to your doctor            Additional Information About Your Visit        MyChart Information     [employer] gives you secure access to your electronic health record. If you see a primary care provider, you can also send messages to your care team and make appointments. If you have questions, please call your primary care clinic.  If you do not have a primary care provider, please call [phone] and they will assist you.      [employer] is an electronic gateway that provides easy, online access to your medical records. With [employer], you can request a clinic appointment, read your test results, renew a prescription or communicate with your care team.     To access your existing account, please contact your [facility] or call [phone] for assistance.        Care EveryWhere ID     This is your Care EveryWhere ID. This could be used by other  organizations to access your [facility] medical records  [id number]        Your Vitals Were     Pulse                   77            Blood Pressure from Last 3 Encounters:   11/01/18 114/71    Weight from Last 3 Encounters:   No data found for Wt              Today, you had the following     No orders found for display         Today's Medication Changes          These changes are accurate as of 11/1/18  1:45 PM.  If you have any questions, ask your nurse or doctor.               Start taking these medicines.        Dose/Directions    spironolactone 50 MG tablet   Commonly known as:  ALDACTONE   Used for:  Hidradenitis suppurativa   Started by:  Des Ventura MD        Dose:  100 mg   Take 2 tablets (100 mg) by mouth daily   Quantity:  60 tablet   Refills:  3         Stop taking these medicines if you haven't already. Please contact your care team if you have questions.     dapsone 100 MG tablet   Stopped by:  Des Ventura MD                Where to get your medicines      [facility], [place] - [address] [place] [address] [address]  [address] [place], [place] [address], [place] [address]     Phone:  [phone]     spironolactone 50 MG tablet                Primary Care Provider    None Specified       No primary provider on file.        Equal Access to Services     [facility]: Abbi Clemens, edy mcneal, qaybta kaalmada dannielle, smita lund. So [facility] [phone].    ATENCIÓN: Si habla español, tiene a barfield disposición servicios gratuitos de asistencia lingüística. Satish al [phone].    We comply with applicable federal civil rights laws and [place] laws. We do not discriminate on the basis of race, color, national origin, age, disability, sex, sexual orientation, or gender identity.            Thank you!     Thank you for choosing [facility] DERMATOLOGY  for your care. Our goal is always to provide you with excellent  care. Hearing back from our patients is one way we can continue to improve our services. Please take a few minutes to complete the written survey that you may receive in the mail after your visit with us. Thank you!             Your Updated Medication List - Protect others around you: Learn how to safely use, store and throw away your medicines at www.disposemymeds.org.          This list is accurate as of 11/1/18  1:45 PM.  Always use your most recent med list.                   Brand Name Dispense Instructions for use Diagnosis    benzoyl peroxide 10 % topical liquid     1 Bottle    Leave on for 3 min, then wash off    Hidradenitis suppurativa, Encounter for long-term (current) use of high-risk medication       clindamycin 1 % topical gel    CLINDAMAX    60 g    Apply topically 2 times daily    Hidradenitis suppurativa, Encounter for long-term (current) use of high-risk medication       levothyroxine 100 MCG tablet    SYNTHROID/LEVOTHROID     100 mcg        norgestim-eth estrad triphasic 0.18/0.215/0.25 MG-35 MCG per tablet    ORTHO TRI-CYCLEN, TRI-SPRINTEC     0.035 mg        spironolactone 50 MG tablet    ALDACTONE    60 tablet    Take 2 tablets (100 mg) by mouth daily    Hidradenitis suppurativa

## 2018-11-01 NOTE — Clinical Note
"11/1/2018       RE: Sheri Pace  [address] [place] [address]     Dear Colleague,    Thank you for referring your patient, Sheri Pace, to the [facility] DERMATOLOGY at [facility]. Please see a copy of my visit note below.    [facility] Dermatology Note    Dermatology Clinic  [facility]  [address] [place] [address]    Dermatology Problem List:  1.Hidradenitis suppurativa of the bilateral inguinal folds, mild  -ILK 2 ml of 20mg/cc 7/26/18  -dapsone 100 mg daily (suspended on 11/12018)  - Spiralactone 100mg daily   - Ordering laser hair removal  - BPO 10% wash, clindamycin 1% gel     Encounter Date: Nov 1, 2018    CC:  Chief Complaint   Patient presents with     Derm Problem     Sheri is here today for follow up for HS, patient states \"things are about the same\"      History of Present Illness:  Ms. Sheri Pace is a 32 year old female from [place] who presents for a 10 week follow-up for hidradenitis suppurativa. Sheri has a longstanding history of HS since the age of 15 which was initially on the axillary folds and then progressed to her inguinal folds a year later. In 2012 she had surgical excision of the hidradenitis of the left axilla, bilateral thighs and groin, left labia majora and left buttock. The axillary regions have healed without recurrence of HS in these areas.     Today she states she has been having severe headaches, which started several months ago, and wonders if it is related to her use of dapsone. She has noticed that the steroid injects helped with the swelling, especially in the groin, but her lesions are returning recently. She has been getting ~2 lesions a month, and some continue to stay open. Overall the dapsone doesn't appear to be helping her. When she has acne, it flairs mostly on the nose and forehead. No issues with breathing or shortness of breath. She is " losing wait due to her weight loss medications. Hx of smoking, but quit.    Past Medical History:   HS    Social History:  The patient works as a  at a college.     Family History:  Denies any family hx of HS.     Medications:  Current Outpatient Prescriptions   Medication Sig Dispense Refill     benzoyl peroxide 10 % LIQD Leave on for 3 min, then wash off 1 Bottle 11     clindamycin (CLINDAMAX) 1 % topical gel Apply topically 2 times daily 60 g 11     dapsone 100 MG tablet Take 1 tablet (100 mg) by mouth daily 30 tablet 0     levothyroxine (SYNTHROID/LEVOTHROID) 100 MCG tablet 100 mcg       norgestim-eth estrad triphasic (ORTHO TRI-CYCLEN, TRI-SPRINTEC) 0.18/0.215/0.25 MG-35 MCG per tablet 0.035 mg       Allergies   Allergen Reactions     Penicillin G Rash     Review of Systems:  -Skin: As above in HPI. No additional skin concerns.    Physical exam:  GEN: This is a well developed, well-nourished female in no acute distress, in a pleasant mood.    SKIN: Focused examination of the groin region.  - 1 tunnel, violacous at the edges with a 1 mm superior ulceration, 2.5 in length  - 1 pink nodular in the R superior Inguinal fold  - 1 mm width cord ~2cm in diameter in the R lower inguinal fold  - 1 pink nodular ~7mm in the R genital region  - No other lesions of concern on areas examined.     Impression/Plan:  1. Hidradenitis suppurativa, bilateral inguinal folds, mild:    Kenalog intralesional injection procedure note (performed by faculty): After verbal consent and discussion of risks including but not limited to atrophy, pain, and bruising,  time out was performed, 4.0 total cc of Kenalog 10 mg/cc was injected intosites on the bilateral inguinal folds.  The patient tolerated the procedure well and left the Dermatology clinic in good condition.    End dapsone usage.    Begin Spirolactone 100mg daily (start at 50mg and increased at 1 week)     Potassium checked previously and wnl    Will plan for laser hair  removal    Continue benzoyl peroxide 10% wash daily.    Continue clindamycin 1% gel BID      Follow-up in 3 months, earlier for new or changing lesions.     Staff Involved:    Scribe Disclosure  I, Vadim Wilson, am serving as a scribe to document services personally performed by Dr. Des [place]ura, based on data collection and the provider's statements to me.     Provider Disclosure:   The documentation recorded by the scribe accurately reflects the services I personally performed and the decisions made by me.    Dse Vent[place] MD, FAAD    Departments of Internal Medicine and Dermatology  [facility]  [phone]        Again, thank you for allowing me to participate in the care of your patient.      Sincerely,    Des Ventura MD

## 2018-11-01 NOTE — NURSING NOTE
"Chief Complaint   Patient presents with     Derm Problem     Sheri is here today for follow up for HS, patient states \"things are about the same\"      Stacy Rebolledo LPN    "

## 2018-11-01 NOTE — LETTER
Date:November 9, 2018      Patient was self referred, no letter generated. Do not send.        [facility] Information

## 2018-11-02 RX ORDER — DAPSONE 100 MG/1
100 TABLET ORAL DAILY
Qty: 30 TABLET | Refills: 0 | OUTPATIENT
Start: 2018-11-02

## 2018-11-08 ENCOUNTER — TELEPHONE (OUTPATIENT)
Dept: DERMATOLOGY | Facility: CLINIC | Age: 33
End: 2018-11-08

## 2018-11-20 ENCOUNTER — TELEPHONE (OUTPATIENT)
Dept: DERMATOLOGY | Facility: CLINIC | Age: 33
End: 2018-11-20

## 2018-11-20 NOTE — TELEPHONE ENCOUNTER
----- Message from Tabby Gibbs MD sent at 11/20/2018  3:35 PM CST -----  Regarding: RE: Laser hair removal   Please, send to financial cousenling  ----- Message -----     From: Des Ventura MD     Sent: 11/13/2018   7:17 PM       To: Tabby Gibbs MD  Subject: RE: Laser hair removal                           This looks good. OK to send out.  Des     ----- Message -----     From: Tabby Gibbs MD     Sent: 11/7/2018   9:48 PM       To: Des Ventura MD, #  Subject: RE: Laser hair removal                           Are you okay with [facility] sending in this letter below on your behalf? If, so, please, sign    To Whom This May Concern,     We are writing to request coverage of laser hair removal for severe hidradenitis suppartiva. This patient has symptoms of including intermittent flares in the groin.     Hidradenitis responds well to laser hair removal as it is a follicular process. We would expect 1 treatments every 3- 6 weeks for a maximum of 12 treatments.     The CPT Code Description utilized would be 82854: Unlisted procedure, skin, mucous membrane and subcutaneous tissue. Estimated cost is $160 per session         This patient is currently shaving, which causes irritation and may be playing a role in new lesions. The only FDA covered treatment for this condition is weekly humira. In addition to being more invasive, the cost of this intervention is much larger.         We strive to provide our patient with outstanding care. Therefore, I request you please contact me at [phone] if you have any questions regarding coverage or our treatment rational.     Des Ventura MD      Department of Dermatology   [employer]     ----- Message -----     From: Des Ventura MD     Sent: 11/1/2018   1:17 PM       To: Tabby Gibbs MD  Subject: Laser hair removal                               Can we send a letter to her insurance to see if they  will cover laser hair removal.

## 2018-11-20 NOTE — TELEPHONE ENCOUNTER
FC - please submit to insurance on behalf of Des Ventura.  Treatment would be done in [place].  Thanks!    Marie Spencer RN

## 2018-11-30 NOTE — TELEPHONE ENCOUNTER
Original PA sent online to [facility] - additional clinical information faxed to [facility] on 11/30

## 2018-12-05 NOTE — TELEPHONE ENCOUNTER
This cma will route to [facility] to review denial letter, insurance company states not enough evidence of treatment for HS.     Amorrce Velasco, CMA

## 2018-12-05 NOTE — TELEPHONE ENCOUNTER
Patient has been denied coverage of laser hair removal upon reviewing additional information sent. Please see media tab for denial. lvm for patient

## 2018-12-18 NOTE — TELEPHONE ENCOUNTER
"----- Message from Tabby Gibbs MD sent at 12/17/2018  4:08 PM CST -----  Regarding: RE: Laser hair removal  Mg derm financial counseling please appeal with letter below. If still rejected with references then give patient cost. She  Could just try it out once and see if it helps too.     To Whom This May Concern,     We are writing to request coverage of long-pulsed 1064-nm Nd: YAG laser treatment for  hidradenitis suppartiva.      We would expect 1 treatments every 3- 6 weeks for a maximum of10 treatments.     The CPT Code Description utilized would be 41492: Unlisted procedure, skin, mucous membrane and subcutaneous tissue.   The approximate cost is $70 per treatment.     Please, see the following references:  Vanesa Feliciano et al. \"Randomized control trial for the treatment of hidradenitis suppurativa with a neodymium?doped yttrium aluminium garnet laser.\" Dermatologic surgery 35.8 (2009): 4375-7773.    Erica Gracia, et al. \"Histopathologic study of hidradenitis suppurativa following long-pulsed 1064-nm Nd: YAG laser treatment.\" Archives of dermatology 147.1 (2011): 21-28.  Gm Weldon., et al. \"Prospective controlled clinical and histopathologic study of hidradenitis suppurativa treated with the long-pulsed neodymium: yttrium-aluminium-garnet laser.\" Journal of the American Academy of Dermatology 62.4 (2010): 637-645.      We strive to provide our patient with outstanding care. Therefore, I request you please contact me at [phone] if you have any questions regarding coverage or our treatment rational.     Tabby Gibbs MD      Department of Dermatology   [employer]   Phone([facility]): [phone]     "

## 2019-02-05 ENCOUNTER — MYC MEDICAL ADVICE (OUTPATIENT)
Dept: DERMATOLOGY | Facility: CLINIC | Age: 34
End: 2019-02-05

## 2019-02-06 ENCOUNTER — TELEPHONE (OUTPATIENT)
Dept: DERMATOLOGY | Facility: CLINIC | Age: 34
End: 2019-02-06

## 2019-02-06 NOTE — TELEPHONE ENCOUNTER
[facility] Call Center    Phone Message    May a detailed message be left on voicemail: yes    Reason for Call: Other: Pt had to cancel an appt due to weather conditions.  Next open appt is 5/2 and Pt is concerned Doctor may think it is too long between appt.  Please have staff call to discuss.     Action Taken: Message routed to:  [facility] ([facility]): dermatology

## 2019-02-06 NOTE — TELEPHONE ENCOUNTER
I spoke to Sheri Pace and have her scheduled for 3/28/19 with Dr. Ventura. Patient understood and had no further questions or concerns.

## 2019-03-20 ENCOUNTER — MYC MEDICAL ADVICE (OUTPATIENT)
Dept: DERMATOLOGY | Facility: CLINIC | Age: 34
End: 2019-03-20
Payer: COMMERCIAL

## 2019-03-20 DIAGNOSIS — L73.2 HIDRADENITIS SUPPURATIVA: ICD-10-CM

## 2019-03-20 DIAGNOSIS — Z79.899 ENCOUNTER FOR LONG-TERM (CURRENT) USE OF HIGH-RISK MEDICATION: ICD-10-CM

## 2019-03-26 ENCOUNTER — TRANSFERRED RECORDS (OUTPATIENT)
Dept: HEALTH INFORMATION MANAGEMENT | Facility: CLINIC | Age: 34
End: 2019-03-26

## 2019-03-28 ENCOUNTER — OFFICE VISIT (OUTPATIENT)
Dept: DERMATOLOGY | Facility: CLINIC | Age: 34
End: 2019-03-28
Payer: COMMERCIAL

## 2019-03-28 ENCOUNTER — TELEPHONE (OUTPATIENT)
Dept: DERMATOLOGY | Facility: CLINIC | Age: 34
End: 2019-03-28

## 2019-03-28 VITALS — SYSTOLIC BLOOD PRESSURE: 123 MMHG | HEART RATE: 77 BPM | DIASTOLIC BLOOD PRESSURE: 74 MMHG

## 2019-03-28 DIAGNOSIS — L73.2 HIDRADENITIS SUPPURATIVA: Primary | ICD-10-CM

## 2019-03-28 ASSESSMENT — PAIN SCALES - GENERAL: PAINLEVEL: NO PAIN (0)

## 2019-03-28 NOTE — NURSING NOTE
Dermatology Rooming Note    Sheri Pace's goals for this visit include:   Chief Complaint   Patient presents with     Derm Problem     Sheri is here for hs follow up      Deborah Lin, CMA

## 2019-03-28 NOTE — PROGRESS NOTES
[facility] Dermatology Note    Dermatology Clinic  [facility]  Clinics and Surgery Center  [address] [place] [address]    Dermatology Problem List:  1.Hidradenitis suppurativa of the bilateral inguinal folds, mild  - to start Humira pending screening labs  - BPO 10% wash, clindamycin 1% gel   - s/p ILK 2 ml of 20mg/cc 7/26/18  - past tx: dapsone 100 mg daily (suspended on 11/1/2018 due to severe headaches) and spironolactone 100mg daily (discontinued due to acne flare)    Encounter Date: Mar 28, 2019    CC:  Chief Complaint   Patient presents with     Derm Problem     Sheri is here for hs follow up      History of Present Illness:  Ms. Sheri Pace is a 33 year old female from [place] who presents for a follow-up for hidradenitis suppurativa. Sheri has a longstanding history of HS since the age of 15 which was initially on the axillary folds and then progressed to her inguinal folds a year later. In 2012 she had surgical excision of the hidradenitis of the left axilla, bilateral thighs and groin, left labia majora and left buttock. The axillary regions have healed without recurrence of HS in these areas.     Today she states that she is willing to start Humira since she has not tolerated other treatments. Has not been using any topicals since she feels like they do not help. Has 2 boils in the left inguinal fold that recently drained and now healing. Has not been taking spironolactone since she felt that it was causing her acne to flare. States that laser hair removal was denied by insurance.      No family Hx of MS. Hx of smoking, but quit.    Past Medical History:   HS    Social History:  The patient works as a  at a [email or web address].     Family History:  Denies any family hx of HS.     Medications:  Current Outpatient Medications   Medication Sig Dispense Refill     benzoyl peroxide 10 % LIQD Leave on for 3 min, then wash off 1 Bottle 11      clindamycin (CLINDAMAX) 1 % topical gel Apply topically 2 times daily 60 g 11     levothyroxine (SYNTHROID/LEVOTHROID) 100 MCG tablet 100 mcg       norgestim-eth estrad triphasic (ORTHO TRI-CYCLEN, TRI-SPRINTEC) 0.18/0.215/0.25 MG-35 MCG per tablet 0.035 mg       spironolactone (ALDACTONE) 50 MG tablet Take 2 tablets (100 mg) by mouth daily 60 tablet 3     Allergies   Allergen Reactions     Penicillin G Rash     Review of Systems:  -Skin: As above in HPI. No additional skin concerns.    Physical exam:  GEN: This is a well developed, well-nourished female in no acute distress, in a pleasant mood.    SKIN: Focused examination of the groin region.  - L inguinal fold, tunnel opening   - nodule on superior groin  - few scattered papules in the R inguinal fold, but no nodules  - No other lesions of concern on areas examined.     Impression/Plan:  1. Hidradenitis suppurativa, bilateral inguinal folds,    Scott stage II, moderate with active tunnel in left groin and 1 nodule    As Sheri has failed multiple treatments including antibiotics, spironolactone, and dapsone, we would like to start Humira to control her HS symptoms as she does have evidence of tunneling. Additionally, laser hair removal was denied by insurance.     Start Humira at HS dosing (160mg for first injection, 80mg 2 weeks later, and then 40mg weekly 2 weeks later)    CBC, CMP, and hepatitis panel WNL in March 2019    QuantGold pending    Continue benzoyl peroxide 10% wash daily    Continue clindamycin 1% gel BID    Follow-up in 3 months, earlier for new or changing lesions.     Staff Involved:   Resident(Lucero Guaman)/Staff(as above)    Lucero Guaman M.D.  PGY-3 Resident  Dermatology  [facility]      Scribe Disclosure  I, Vadim Wilson, am serving as a scribe to document services personally performed by Dr. Des Ventura, based on data collection and the provider's statements to me.       Provider Disclosure:   The documentation  recorded by the scribe accurately reflects the services I personally performed and the decisions made by me.    Des Ventura MD, FAAD    Departments of Internal Medicine and Dermatology  [facility]  [phone]    Staff Physician Comments:   I saw and evaluated the patient with the resident and I agree with the assessment and plan.  I was present for the examination.    Des Ventura MD, FAAD    Departments of Internal Medicine and Dermatology  [facility]  [phone]

## 2019-03-28 NOTE — LETTER
Date:April 12, 2019      Patient was self referred, no letter generated. Do not send.        [facility] Information

## 2019-03-28 NOTE — TELEPHONE ENCOUNTER
PA Initiation    Medication: Humira 80mg/ 0.8mL & 40mg/ 0.4mL pens (HS Start kit & weekly maintenance)  Insurance Company: [employer] ([facility]) - Phone [phone] Fax [phone]  Pharmacy Filling the Rx: [facility] - [place], [place] - [address]  Filling Pharmacy Phone: [phone]  Filling Pharmacy Fax:    Start Date: 3/28/2019    ** New start Humira for HS; pt naive to injectable biologics; [place] so desires injection training in-home (writer completing [employer]/ Complete paperwork); pt to receive [employer] communication with copay card enrollment, approval details, and specialty pharmacy info

## 2019-03-28 NOTE — LETTER
3/28/2019       RE: Sheri Pace  [address] [place] [address]     Dear Colleague,    Thank you for referring your patient, Sheri Pace, to the [facility]ERMATOLOG[facility]. Please see a copy of my visit note below.    [facility] Dermatology Note    Dermatology Clinic  [facility] and [facility] Center  [address] [place] [address]    Dermatology Problem List:  1.Hidradenitis suppurativa of the bilateral inguinal folds, mild  - to start Humira pending screening labs  - BPO 10% wash, clindamycin 1% gel   - s/p ILK 2 ml of 20mg/cc 7/26/18  - past tx: dapsone 100 mg daily (suspended on 11/1/2018 due to severe headaches) and spironolactone 100mg daily (discontinued due to acne flare)    Encounter Date: Mar 28, 2019    CC:  Chief Complaint   Patient presents with     Derm Problem     Sheri is here for hs follow up      History of Present Illness:  Ms. Sheri Pace is a 33 year old female from [place] who presents for a follow-up for hidradenitis suppurativa. Sheri has a longstanding history of HS since the age of 15 which was initially on the axillary folds and then progressed to her inguinal folds a year later. In 2012 she had surgical excision of the hidradenitis of the left axilla, bilateral thighs and groin, left labia majora and left buttock. The axillary regions have healed without recurrence of HS in these areas.     Today she states that she is willing to start Humira since she has not tolerated other treatments. Has not been using any topicals since she feels like they do not help. Has 2 boils in the left inguinal fold that recently drained and now healing. Has not been taking spironolactone since she felt that it was causing her acne to flare. States that laser hair removal was denied by insurance.      No family Hx of MS. Hx of smoking, but quit.    Past Medical History:   HS    Social History:  The  patient works as a  at a [employer].     Family History:  Denies any family hx of HS.     Medications:  Current Outpatient Medications   Medication Sig Dispense Refill     benzoyl peroxide 10 % LIQD Leave on for 3 min, then wash off 1 Bottle 11     clindamycin (CLINDAMAX) 1 % topical gel Apply topically 2 times daily 60 g 11     levothyroxine (SYNTHROID/LEVOTHROID) 100 MCG tablet 100 mcg       norgestim-eth estrad triphasic (ORTHO TRI-CYCLEN, TRI-SPRINTEC) 0.18/0.215/0.25 MG-35 MCG per tablet 0.035 mg       spironolactone (ALDACTONE) 50 MG tablet Take 2 tablets (100 mg) by mouth daily 60 tablet 3     Allergies   Allergen Reactions     Penicillin G Rash     Review of Systems:  -Skin: As above in HPI. No additional skin concerns.    Physical exam:  GEN: This is a well developed, well-nourished female in no acute distress, in a pleasant mood.    SKIN: Focused examination of the groin region.  - L inguinal fold, tunnel opening   - nodule on superior groin  - few scattered papules in the R inguinal fold, but no nodules  - No other lesions of concern on areas examined.     Impression/Plan:  1. Hidradenitis suppurativa, bilateral inguinal folds,    Scott stage II, moderate with active tunnel in left groin and 1 nodule    As Sheri has failed multiple treatments including antibiotics, spironolactone, and dapsone, we would like to start Humira to control her HS symptoms as she does have evidence of tunneling. Additionally, laser hair removal was denied by insurance.     Start Humira at HS dosing (160mg for first injection, 80mg 2 weeks later, and then 40mg weekly 2 weeks later)    CBC, CMP, and hepatitis panel WNL in March 2019    QuantGold pending    Continue benzoyl peroxide 10% wash daily    Continue clindamycin 1% gel BID    Follow-up in 3 months, earlier for new or changing lesions.     Staff Involved:   Resident(Lucero Guaman)/Staff(as above)    Lucero Guaman M.D.  PGY-3 Resident  Dermatology  [facility]  [place]      Scribe Disclosure  I, Vadim Wilson, am serving as a scribe to document services personally performed by Dr. Des Ventura, based on data collection and the provider's statements to me.       Provider Disclosure:   The documentation recorded by the scribe accurately reflects the services I personally performed and the decisions made by me.    Des Ventura MD, FAAD    Departments of Internal Medicine and Dermatology  [facility]  [phone]    Staff Physician Comments:   I saw and evaluated the patient with the resident and I agree with the assessment and plan.  I was present for the examination.    Des Ventura MD, FAAD    Departments of Internal Medicine and Dermatology  [facility]  [phone]        Again, thank you for allowing me to participate in the care of your patient.      Sincerely,    Des Ventura MD

## 2019-03-29 NOTE — TELEPHONE ENCOUNTER
Humira Complete form faxed in to their program for the pt to receive support services and injection training.

## 2019-04-02 NOTE — TELEPHONE ENCOUNTER
Rec'd call back from Leigh at [facility] - denial of Humira was based on need for documentation that pt has tried at least 2 different systemic antibiotics for at least 90 days apiece. She states denial letter has been faxed so writer will watch for this.

## 2019-04-03 NOTE — TELEPHONE ENCOUNTER
Medication Appeal Initiation    We have initiated an appeal for the requested medication:  Medication: Humira 80mg/ 0.8mL & 40mg/ 0.4mL pens (HS Start kit & weekly maintenance) - DENIED, APPEALING  Appeal Start Date:  4/3/2019  Insurance Company: [facility] - Phone [phone] Fax [phone]  Comments:   LMN along with chart notes faxed to [place] appeals dept via fax# [phone]    ** Pt notified

## 2019-04-03 NOTE — TELEPHONE ENCOUNTER
PRIOR AUTHORIZATION DENIED    Medication: Humira 80mg/ 0.8mL & 40mg/ 0.4mL pens (HS Start kit & weekly maintenance) - DENIED, APPEALING    Denial Date: 3/30/2019    Denial Rationale: Proof needed that pt has tried/ failed at least 2 systemic antibiotics for 90+ days apiece    Appeal Information: Yes, on letter - will appeal and franc urgent

## 2019-04-04 NOTE — TELEPHONE ENCOUNTER
MEDICATION APPEAL APPROVED    Medication: Humira 80mg/ 0.8mL & 40mg/ 0.4mL pens   Authorization Effective Date:  4/3/19  Authorization Expiration Date:  10/3/19  Approved Dose/Quantity: HS Start kit & weekly maintenance  Reference #: CMM key# [id number]   Insurance Company: [employer] - Phone [phone] Fax [phone]  Expected CoPay:    $1100+ before copay assistance  CoPay Card Available: Yes    Foundation Assistance Needed:    Which Pharmacy is filling the prescription (Not needed for infusion/clinic administered): [facility] - [place], [place]

## 2019-04-04 NOTE — TELEPHONE ENCOUNTER
Rec'd VM from Leigh at [facility] (ph# [phone]) that Humira was approved. Unknown details but test claim processes successfully. Once rx is sent and info rec'd, will update pt. Writer NORTH for Leigh to get add'l details on approval.

## 2019-04-10 DIAGNOSIS — L73.2 HIDRADENITIS SUPPURATIVA: Primary | ICD-10-CM

## 2019-04-25 DIAGNOSIS — L73.2 HIDRADENITIS SUPPURATIVA: ICD-10-CM

## 2019-04-25 NOTE — TELEPHONE ENCOUNTER
Humira support program stating pt needing a replacement Humira pen. Provided free to pt if sent directly to [employer]. Added to meds/ orders.

## 2019-04-29 ENCOUNTER — TELEPHONE (OUTPATIENT)
Dept: DERMATOLOGY | Facility: CLINIC | Age: 34
End: 2019-04-29

## 2019-04-29 DIAGNOSIS — L73.2 HIDRADENITIS SUPPURATIVA: ICD-10-CM

## 2019-04-29 NOTE — TELEPHONE ENCOUNTER
It looks like in all of her notes she should be on the 80mg pan. Can you please send in an escript for the 80mg pen?   Thank you,  Gretta LEDESMA CMA

## 2019-04-29 NOTE — TELEPHONE ENCOUNTER
I called the patient and I let him know it may be a couple of days before we hear back from Dr. Ventura. She states that she is fine with that but she is due for her next injection on Friday.   Gretta Ashby, DARIA

## 2019-04-29 NOTE — TELEPHONE ENCOUNTER
[facility] Call Center    Phone Message    May a detailed message be left on voicemail: yes    Reason for Call: Medication Question or concern regarding medication   Prescription Clarification  Name of Medication: adalimumab (HUMIRA *CF* PEN) 40 MG/0.4ML pen kit  Prescribing Provider: Dr. Des Ventura/Dr. Norberto Hercules   Pharmacy: [facility]    What on the order needs clarification? Mia with [facility] requesting clarification on whether this med is to be 40mg pen or 80mg. Please call when available.    Action Taken: Message routed to:  [facility] ([facility]): Derm

## 2019-04-30 NOTE — TELEPHONE ENCOUNTER
After loading she should go to 40mg weekly. I reordered this for her.   Thanks,   lorna     I called the pharmacy and I let them know that 40mg is her dose after the 80mg pen has been used. The patient misfired the 80mg pen. I let them know that she needs the pen by Friday and then she will be on the 40mg.   Gretta Ashby, [facility]

## 2019-06-03 ENCOUNTER — MYC MEDICAL ADVICE (OUTPATIENT)
Dept: DERMATOLOGY | Facility: CLINIC | Age: 34
End: 2019-06-03

## 2019-09-04 ENCOUNTER — MYC MEDICAL ADVICE (OUTPATIENT)
Dept: DERMATOLOGY | Facility: CLINIC | Age: 34
End: 2019-09-04

## 2019-09-04 DIAGNOSIS — Z79.899 ENCOUNTER FOR LONG-TERM (CURRENT) USE OF HIGH-RISK MEDICATION: Primary | ICD-10-CM

## 2019-09-10 ENCOUNTER — TRANSFERRED RECORDS (OUTPATIENT)
Dept: HEALTH INFORMATION MANAGEMENT | Facility: CLINIC | Age: 34
End: 2019-09-10

## 2019-09-12 ENCOUNTER — OFFICE VISIT (OUTPATIENT)
Dept: DERMATOLOGY | Facility: CLINIC | Age: 34
End: 2019-09-12
Payer: COMMERCIAL

## 2019-09-12 VITALS — DIASTOLIC BLOOD PRESSURE: 80 MMHG | HEART RATE: 96 BPM | SYSTOLIC BLOOD PRESSURE: 113 MMHG

## 2019-09-12 DIAGNOSIS — L73.2 HIDRADENITIS SUPPURATIVA: Primary | ICD-10-CM

## 2019-09-12 DIAGNOSIS — Z79.899 ENCOUNTER FOR LONG-TERM (CURRENT) USE OF HIGH-RISK MEDICATION: ICD-10-CM

## 2019-09-12 ASSESSMENT — PAIN SCALES - GENERAL: PAINLEVEL: SEVERE PAIN (7)

## 2019-09-12 NOTE — PROGRESS NOTES
[facility] Dermatology Note    Dermatology Clinic  [facility]  Clinics and Surgery Center  [address] [place] [address]    Dermatology Problem List:  1.Hidradenitis suppurativa of the bilateral inguinal folds, mild  - started Humira 4/19/19  - clindamycin 1% gel   - s/p ILK 2 ml of 20mg/cc 7/26/18  - past tx: dapsone 100 mg daily (suspended on 11/1/2018 due to severe headaches) and spironolactone 100mg daily (discontinued due to acne flare), BPO 10% wash  2. Hypothyroidism  -Levothyroxine    Encounter Date: Sep 12, 2019    CC:  Chief Complaint   Patient presents with     Derm Problem     Sheri is here for a HS follow up, states she's flaring in her groin area.     History of Present Illness:  Ms. Sheri Pace is a 33 year old female from [place] who presents for a follow-up for hidradenitis suppurativa. Sheri has a longstanding history of HS since the age of 15 which was initially on the axillary folds and then progressed to her inguinal folds a year later. In 2012 she had surgical excision of the hidradenitis of the left axilla, bilateral thighs and groin, left labia majora and left buttock. The axillary regions have healed without recurrence of HS in these areas.     The patient last saw us in clinic on 3/28/18. At the time she had not been using any topicals as she didn't find them helpful. She also wasn't taking spironolactone, since she felt it contributed to acne flares. She agreed to start on Humira.   She was also interested in laser hair removal for the bilateral axilla but didn't pursue due to insurance restrictions.    In the interim, the patient started Humira on 4/19/19. She recently had CBC and CMP done that indicated elevated glucose. Everything else was normal.    Today the patient states that the Humira is working. About a week ago she started having a flare-up in the L groin. There is a draining tunnel in that area associated with pain. R  groin has started having ulcerations and fissuring associated with tenderness and redness, and draining in the last 2 days. Has not been using any topical medications as previous ones do not work. However, she is feeling better today. She states that since starting Humira she has not had any new sores.   Denies BPO wash was very helpful.      Past Medical History:   HS    Social History:  The patient works as a  at a college. She has 3 children (all boys); the oldest is 17 years of age.    Family History:  Denies any family hx of HS.     Medications:  Current Outpatient Medications   Medication Sig Dispense Refill     adalimumab (HUMIRA *CF* PEN) 40 MG/0.4ML pen kit Inject 0.4 mLs (40 mg) Subcutaneous every 7 days 6 each 3     adalimumab (HUMIRA PEN) 40 MG/0.8ML pen kit 160 mg subQ (4 syringes) on day 1, followed by 80 mg subQ on day 15, and then 40 mg subQ every week starting on day 29 6 each 3     levothyroxine (SYNTHROID/LEVOTHROID) 100 MCG tablet 100 mcg       norgestim-eth estrad triphasic (ORTHO TRI-CYCLEN, TRI-SPRINTEC) 0.18/0.215/0.25 MG-35 MCG per tablet 0.035 mg       adalimumab (HUMIRA *CF* PEN-CD/UC/HS STARTER) 80 MG/0.8ML pen kit Inject 0.8 mLs (80 mg) Subcutaneous once for 1 dose 3 each 0     benzoyl peroxide 10 % LIQD Leave on for 3 min, then wash off (Patient not taking: Reported on 3/28/2019) 1 Bottle 11     clindamycin (CLINDAMAX) 1 % topical gel Apply topically 2 times daily (Patient not taking: Reported on 3/28/2019) 60 g 11     spironolactone (ALDACTONE) 50 MG tablet Take 2 tablets (100 mg) by mouth daily (Patient not taking: Reported on 3/28/2019) 60 tablet 3     Allergies   Allergen Reactions     Penicillin G Rash     Review of Systems:  -Skin: As above in HPI. No additional skin concerns.    Physical exam:  GEN: This is a well developed, well-nourished female in no acute distress, in a pleasant mood.    SKIN: Focused examination of the groin and inguinal folds bilaterally  regions.  - 2 pink nodules in suprapubic region  -4 papules in R inguinal fold  -1 tunnel L inguinal fold  - No other lesions of concern on areas examined.     Impression/Plan:  1. Hidradenitis suppurativa, bilateral inguinal folds,    Scott stage II, moderate with active tunnel in left groin and 1 nodule    As Sheri has failed multiple treatments including antibiotics, spironolactone, and dapsone, we have started her on Humira to control her HS symptoms as she does have evidence of a chronic tunneled plaque in left inguinal fold. Additionally, laser hair removal was denied by insurance.     Continue with Humira 40mg weekly    CBC, CMP, and hepatitis panel WNL in March 2019    BPO 10% was not helpful per pt, so Hibiclens was prescribed.    Continue clindamycin 1% gel BID    Kenalog injection procedure note: After positioning and cleansing with isopropyl alcohol, 4.0 total cc of Kenalog 10 mg/cc was injected into 10  Lesion(s) on the inguinal fold(s) and suprapubic.  The patient tolerated the procedure well and left the Dermatology clinic in good condition.    Follow-up in 3 months, earlier for new or changing lesions.     Staff Involved:    Scribe Disclosure  I, Britney Luong, am serving as a scribe to document services personally performed by Dr. Des Ventura, based on data collection and the provider's statements to me.     Provider Disclosure:   The documentation recorded by the scribe accurately reflects the services I personally performed and the decisions made by me.    Des Ventura MD, FAAD    Departments of Internal Medicine and Dermatology  [facility]  [phone]

## 2019-09-12 NOTE — NURSING NOTE
Drug Administration Record    Prior to injection, verified patient identity using patient's name and date of birth.  Due to injection administration, patient instructed to remain in clinic for 15 minutes  afterwards, and to report any adverse reaction to me immediately.    Drug Name: triamcinolone acetonide(kenalog)  Dose: 4mL of triamcinolone 10mg/mL, 40mg dose  Route administered: ID  NDC #: [id number]: Kenalog-10 ([id number])  Amount of waste(mL):1  Reason for waste: Multi dose vial    LOT #: [id number]  SITE: body  : [employer]  EXPIRATION DATE: JAN2021

## 2019-09-12 NOTE — LETTER
Date:September 13, 2019      Patient was self referred, no letter generated. Do not send.        [facility] Health Information

## 2019-09-12 NOTE — LETTER
9/12/2019       RE: Sheri Pace  [address] [place] [address]     Dear Colleague,    Thank you for referring your patient, Sheri Pace, to the [facility] DERMATOLOGY at [facility]. Please see a copy of my visit note below.    [facility] Dermatology Note    Dermatology Clinic  [facility]  Clinics and Surgery Center  [address] [place] [address]    Dermatology Problem List:  1.Hidradenitis suppurativa of the bilateral inguinal folds, mild  - started Humira 4/19/19  - clindamycin 1% gel   - s/p ILK 2 ml of 20mg/cc 7/26/18  - past tx: dapsone 100 mg daily (suspended on 11/1/2018 due to severe headaches) and spironolactone 100mg daily (discontinued due to acne flare), BPO 10% wash  2. Hypothyroidism  -Levothyroxine    Encounter Date: Sep 12, 2019    CC:  Chief Complaint   Patient presents with     Derm Problem     Sheri is here for a HS follow up, states she's flaring in her groin area.     History of Present Illness:  Ms. Sheri Pace is a 33 year old female from [place] who presents for a follow-up for hidradenitis suppurativa. Sheri has a longstanding history of HS since the age of 15 which was initially on the axillary folds and then progressed to her inguinal folds a year later. In 2012 she had surgical excision of the hidradenitis of the left axilla, bilateral thighs and groin, left labia majora and left buttock. The axillary regions have healed without recurrence of HS in these areas.     The patient last saw us in clinic on 3/28/18. At the time she had not been using any topicals as she didn't find them helpful. She also wasn't taking spironolactone, since she felt it contributed to acne flares. She agreed to start on Humira.   She was also interested in laser hair removal for the bilateral axilla but didn't pursue due to insurance restrictions.    In the interim, the patient started Humira on 4/19/19. She recently  had CBC and CMP done that indicated elevated glucose. Everything else was normal.    Today the patient states that the Humira is working. About a week ago she started having a flare-up in the L groin. There is a draining tunnel in that area associated with pain. R groin has started having ulcerations and fissuring associated with tenderness and redness, and draining in the last 2 days. Has not been using any topical medications as previous ones do not work. However, she is feeling better today. She states that since starting Humira she has not had any new sores.   Denies BPO wash was very helpful.      Past Medical History:   HS    Social History:  The patient works as a  at a [employer]. She has 3 children (all boys); the oldest is 17 years of age.    Family History:  Denies any family hx of HS.     Medications:  Current Outpatient Medications   Medication Sig Dispense Refill     adalimumab (HUMIRA *CF* PEN) 40 MG/0.4ML pen kit Inject 0.4 mLs (40 mg) Subcutaneous every 7 days 6 each 3     adalimumab (HUMIRA PEN) 40 MG/0.8ML pen kit 160 mg subQ (4 syringes) on day 1, followed by 80 mg subQ on day 15, and then 40 mg subQ every week starting on day 29 6 each 3     levothyroxine (SYNTHROID/LEVOTHROID) 100 MCG tablet 100 mcg       norgestim-eth estrad triphasic (ORTHO TRI-CYCLEN, TRI-SPRINTEC) 0.18/0.215/0.25 MG-35 MCG per tablet 0.035 mg       adalimumab (HUMIRA *CF* PEN-CD/UC/HS STARTER) 80 MG/0.8ML pen kit Inject 0.8 mLs (80 mg) Subcutaneous once for 1 dose 3 each 0     benzoyl peroxide 10 % LIQD Leave on for 3 min, then wash off (Patient not taking: Reported on 3/28/2019) 1 Bottle 11     clindamycin (CLINDAMAX) 1 % topical gel Apply topically 2 times daily (Patient not taking: Reported on 3/28/2019) 60 g 11     spironolactone (ALDACTONE) 50 MG tablet Take 2 tablets (100 mg) by mouth daily (Patient not taking: Reported on 3/28/2019) 60 tablet 3     Allergies   Allergen Reactions     Penicillin G Rash      Review of Systems:  -Skin: As above in HPI. No additional skin concerns.    Physical exam:  GEN: This is a well developed, well-nourished female in no acute distress, in a pleasant mood.    SKIN: Focused examination of the groin and inguinal folds bilaterally regions.  - 2 pink nodules in suprapubic region  -4 papules in R inguinal fold  -1 tunnel L inguinal fold  - No other lesions of concern on areas examined.     Impression/Plan:  1. Hidradenitis suppurativa, bilateral inguinal folds,    Scott stage II, moderate with active tunnel in left groin and 1 nodule    As Sheri has failed multiple treatments including antibiotics, spironolactone, and dapsone, we have started her on Humira to control her HS symptoms as she does have evidence of a chronic tunneled plaque in left inguinal fold. Additionally, laser hair removal was denied by insurance.     Continue with Humira 40mg weekly    CBC, CMP, and hepatitis panel WNL in March 2019    BPO 10% was not helpful per pt, so Hibiclens was prescribed.    Continue clindamycin 1% gel BID    Kenalog injection procedure note: After positioning and cleansing with isopropyl alcohol, 4.0 total cc of Kenalog 10 mg/cc was injected into 10  Lesion(s) on the inguinal fold(s) and suprapubic.  The patient tolerated the procedure well and left the Dermatology clinic in good condition.    Follow-up in 3 months, earlier for new or changing lesions.     Staff Involved:    Scribe Disclosure  I, Britney Luong, am serving as a scribe to document services personally performed by Dr. Des [place], based on data collection and the provider's statements to me.     Provider Disclosure:   The documentation recorded by the scribe accurately reflects the services I personally performed and the decisions made by me.    Des [place] MD, FAAD    Departments of Internal Medicine and Dermatology  [facility]  [phone]              Again, thank you for allowing me to  participate in the care of your patient.      Sincerely,    Des [place] MD

## 2019-09-12 NOTE — NURSING NOTE
Dermatology Rooming Note    Sheri Pace's goals for this visit include:   Chief Complaint   Patient presents with     Derm Problem     Sheri is here for a HS follow up, states she's flaring in her groin area.       Mia Neff LPN

## 2019-10-24 ENCOUNTER — TELEPHONE (OUTPATIENT)
Dept: DERMATOLOGY | Facility: CLINIC | Age: 34
End: 2019-10-24

## 2019-10-30 NOTE — TELEPHONE ENCOUNTER
Prior Authorization Approval    Authorization Effective Date: 10/23/2019  Authorization Expiration Date: 10/23/2020  Medication: Humira 40mg/ 0.4mL pens   Approved Dose/Quantity: Weekly dosing  Reference #:     Insurance Company: [facility] - Phone [phone] Fax [phone]  Expected CoPay:       CoPay Card Available: Yes    Foundation Assistance Needed:    Which Pharmacy is filling the prescription (Not needed for infusion/clinic administered): [facility] - [place], [place] - [address]  Pharmacy Notified: Yes  Patient Notified:

## 2019-12-26 ENCOUNTER — OFFICE VISIT (OUTPATIENT)
Dept: DERMATOLOGY | Facility: CLINIC | Age: 34
End: 2019-12-26
Payer: COMMERCIAL

## 2019-12-26 VITALS — SYSTOLIC BLOOD PRESSURE: 117 MMHG | DIASTOLIC BLOOD PRESSURE: 70 MMHG | HEART RATE: 70 BPM

## 2019-12-26 DIAGNOSIS — L73.2 HIDRADENITIS SUPPURATIVA: ICD-10-CM

## 2019-12-26 ASSESSMENT — PAIN SCALES - GENERAL: PAINLEVEL: NO PAIN (0)

## 2019-12-26 NOTE — PROGRESS NOTES
[facility] Dermatology Note    Dermatology Clinic  [facility]  Clinics and Surgery Center  [address] [place] [address]    Dermatology Problem List:  1.Hidradenitis suppurativa of the bilateral inguinal folds, Scott stage II  - started Humira 4/19/19  - Hibiclens  - past tx: dapsone 100 mg daily (suspended on 11/1/2018 due to severe headaches) and spironolactone 100mg daily (discontinued due to acne flare), BPO 10% wash  - placed a referral to derm surgery for excision  - ILK 10 9/12/19  2. Hypothyroidism  -Levothyroxine    Encounter Date: Dec 26, 2019    CC:  Chief Complaint   Patient presents with     Derm Problem     humira follow up, going well for her       History of Present Illness:  Ms. Sheri Pace is a 34 year old female from [place] who presents for a follow-up for hidradenitis suppurativa. Sheri has a longstanding history of HS since the age of 15 which was initially on the axillary folds and then progressed to her inguinal folds a year later. In 2012 she had surgical excision of the hidradenitis of the left axilla, bilateral thighs and groin, left labia majora and left buttock. The axillary regions have healed without recurrence of HS in these areas.     The patient was last seen in clinic on 9/12/19 for HS followup. At the time she was started on Humira 40mg weekly and Hibiclens was prescribed. She was to continue clindamycin 1% gel BID and had kenalog injection to 10x lesions on the inguinal folds and suprapubic regions.    Today the patient reports she continues to improve since her last visit. She continues to have active involvement in the groin with weekly flares.  Notes that she continues having symptoms on the L inguinal fold; it bothers her on a weekly basis. She would like to consider excision for the region.She continues to use Hibiclens a couple times a week, no difference if used regularly. Not using other topicals. Tolerating  Humira okay. Denies fever/chills other infections since starting Humira. She she had night sweats over the past week with cold like symptoms. She recently moved into a new home and is still trying to figure out temperature regulation. ILK injection into left inguinal fold performed at last clinic visit, patient noticed transient benefit but does not feel she needs another injection today .She shares that she would like to see a hidradenitis suppurativa specialist in [place] for refills.    The patient is otherwise feeling well overall today. There are no other skin concerns at this time.      Past Medical History:   HS  Hypothyroidism    Social History:  The patient works as a  at a [employer]. She has 3 children (all boys); the oldest is 17 years of age.    Family History:  Denies any family hx of HS.     Medications:  Current Outpatient Medications   Medication Sig Dispense Refill     adalimumab (HUMIRA *CF* PEN) 40 MG/0.4ML pen kit Inject 0.4 mLs (40 mg) Subcutaneous every 7 days 6 each 3     adalimumab (HUMIRA PEN) 40 MG/0.8ML pen kit 160 mg subQ (4 syringes) on day 1, followed by 80 mg subQ on day 15, and then 40 mg subQ every week starting on day 29 6 each 3     chlorhexidine (HIBICLENS) 4 % liquid Apply topically daily In the shower 946 mL 3     levothyroxine (SYNTHROID/LEVOTHROID) 100 MCG tablet 100 mcg       norgestim-eth estrad triphasic (ORTHO TRI-CYCLEN, TRI-SPRINTEC) 0.18/0.215/0.25 MG-35 MCG per tablet 0.035 mg       clindamycin (CLINDAMAX) 1 % topical gel Apply topically 2 times daily (Patient not taking: Reported on 3/28/2019) 60 g 11     Allergies   Allergen Reactions     Penicillin G Rash     Review of Systems:  -Skin: As above in HPI. No additional skin concerns.    Physical exam:  GEN: This is a well developed, well-nourished female in no acute distress, in a pleasant mood.    SKIN: Focused examination of the groin and inguinal folds bilaterally regions.  - L inguinal fold, there  was a healing sinus tract not draining. No abscesses palpated  - No other lesions of concern on areas examined.     Impression/Plan:  1. Hidradenitis suppurativa, bilateral inguinal folds,    Scott stage II, moderate with active tunnel in left groin and 1 nodule    As Sheri has failed multiple treatments including antibiotics, spironolactone, and dapsone, we previously started her on Humira to control her HS symptoms as she does have evidence of a chronic tunneled plaque in left inguinal fold. Additionally, laser hair removal was denied by insurance.     Placed a referral to derm surgery for excision at the L inguinal fold.     Continue with Humira 40mg weekly. Refills for 1 year provided today    CBC, CMP in 9/2019 wnl; Quant gold 3/2019 negative    Patient to continue Hibiclens wash daily      Follow up in a year for refills.      Staff Involved:    Scribe Disclosure  I, Britney Ag, am serving as a scribe to document services personally performed by Dr. Des [place], based on data collection and the provider's statements to me.     Karo Clay PGY2, Dermatology Resident  This patient was staffed with Dr. [place]    Provider Disclosure:   The documentation recorded by the scribe accurately reflects the services I personally performed and the decisions made by me.    Des [place] MD, FAAD    Departments of Internal Medicine and Dermatology  [facility]  [phone]      Staff Physician Comments:   I saw and evaluated the patient with the resident and I agree with the assessment and plan.  I was present for the examination.    Des [place] MD, FAAD    Departments of Internal Medicine and Dermatology  [facility]  [phone]

## 2019-12-26 NOTE — LETTER
Date:December 27, 2019      Patient was self referred, no letter generated. Do not send.        [facility] Health Information

## 2019-12-26 NOTE — NURSING NOTE
Chief Complaint   Patient presents with     Derm Problem     humira follow up, going well for her     Emma Gallardo, EMT

## 2019-12-26 NOTE — LETTER
12/26/2019       RE: Sheri Pace  [address]  [facility] [address]     Dear Colleague,    Thank you for referring your patient, Sheri Pace, to the [facility] DERMATOLOGY at [facility]. Please see a copy of my visit note below.    [facility] Dermatology Note    Dermatology Clinic  [facility]  Clinics and Surgery Center  [address] [place] [address]    Dermatology Problem List:  1.Hidradenitis suppurativa of the bilateral inguinal folds, Scott stage II  - started Humira 4/19/19  - Hibiclens  - past tx: dapsone 100 mg daily (suspended on 11/1/2018 due to severe headaches) and spironolactone 100mg daily (discontinued due to acne flare), BPO 10% wash  - placed a referral to derm surgery for excision  - ILK 10 9/12/19  2. Hypothyroidism  -Levothyroxine    Encounter Date: Dec 26, 2019    CC:  Chief Complaint   Patient presents with     Derm Problem     humira follow up, going well for her       History of Present Illness:  Ms. Sheri Pace is a 34 year old female from [place] who presents for a follow-up for hidradenitis suppurativa. Sheri has a longstanding history of HS since the age of 15 which was initially on the axillary folds and then progressed to her inguinal folds a year later. In 2012 she had surgical excision of the hidradenitis of the left axilla, bilateral thighs and groin, left labia majora and left buttock. The axillary regions have healed without recurrence of HS in these areas.     The patient was last seen in clinic on 9/12/19 for HS followup. At the time she was started on Humira 40mg weekly and Hibiclens was prescribed. She was to continue clindamycin 1% gel BID and had kenalog injection to 10x lesions on the inguinal folds and suprapubic regions.    Today the patient reports she continues to improve since her last visit. She continues to have active involvement in the groin with weekly flares.  Notes  that she continues having symptoms on the L inguinal fold; it bothers her on a weekly basis. She would like to consider excision for the region.She continues to use Hibiclens a couple times a week, no difference if used regularly. Not using other topicals. Tolerating Humira okay. Denies fever/chills other infections since starting Humira. She she had night sweats over the past week with cold like symptoms. She recently moved into a new home and is still trying to figure out temperature regulation. ILK injection into left inguinal fold performed at last clinic visit, patient noticed transient benefit but does not feel she needs another injection today .She shares that she would like to see a hidradenitis suppurativa specialist in [place] for refills.    The patient is otherwise feeling well overall today. There are no other skin concerns at this time.      Past Medical History:   HS  Hypothyroidism    Social History:  The patient works as a  at a college. She has 3 children (all boys); the oldest is 17 years of age.    Family History:  Denies any family hx of HS.     Medications:  Current Outpatient Medications   Medication Sig Dispense Refill     adalimumab (HUMIRA *CF* PEN) 40 MG/0.4ML pen kit Inject 0.4 mLs (40 mg) Subcutaneous every 7 days 6 each 3     adalimumab (HUMIRA PEN) 40 MG/0.8ML pen kit 160 mg subQ (4 syringes) on day 1, followed by 80 mg subQ on day 15, and then 40 mg subQ every week starting on day 29 6 each 3     chlorhexidine (HIBICLENS) 4 % liquid Apply topically daily In the shower 946 mL 3     levothyroxine (SYNTHROID/LEVOTHROID) 100 MCG tablet 100 mcg       norgestim-eth estrad triphasic (ORTHO TRI-CYCLEN, TRI-SPRINTEC) 0.18/0.215/0.25 MG-35 MCG per tablet 0.035 mg       clindamycin (CLINDAMAX) 1 % topical gel Apply topically 2 times daily (Patient not taking: Reported on 3/28/2019) 60 g 11     Allergies   Allergen Reactions     Penicillin G Rash     Review of Systems:  -Skin:  As above in HPI. No additional skin concerns.    Physical exam:  GEN: This is a well developed, well-nourished female in no acute distress, in a pleasant mood.    SKIN: Focused examination of the groin and inguinal folds bilaterally regions.  - L inguinal fold, there was a healing sinus tract not draining. No abscesses palpated  - No other lesions of concern on areas examined.     Impression/Plan:  1. Hidradenitis suppurativa, bilateral inguinal folds,    Scott stage II, moderate with active tunnel in left groin and 1 nodule    As Sheri has failed multiple treatments including antibiotics, spironolactone, and dapsone, we previously started her on Humira to control her HS symptoms as she does have evidence of a chronic tunneled plaque in left inguinal fold. Additionally, laser hair removal was denied by insurance.     Placed a referral to derm surgery for excision at the L inguinal fold.     Continue with Humira 40mg weekly. Refills for 1 year provided today    CBC, CMP in 9/2019 wnl; Quant gold 3/2019 negative    Patient to continue Hibiclens wash daily      Follow up in a year for refills.      Staff Involved:    Scribe Disclosure  I, Britney Ag, am serving as a scribe to document services personally performed by Dr. Des [place], based on data collection and the provider's statements to me.     Karo Clay PGY2, Dermatology Resident  This patient was staffed with Dr. [place]    Provider Disclosure:   The documentation recorded by the scribe accurately reflects the services I personally performed and the decisions made by me.    Des [place] MD, FAAD    Departments of Internal Medicine and Dermatology  [facility]  [phone]      Staff Physician Comments:   I saw and evaluated the patient with the resident and I agree with the assessment and plan.  I was present for the examination.    Des [place] MD, FAAD    Departments of Internal Medicine and  Dermatology  [facility]  [phone]                Again, thank you for allowing me to participate in the care of your patient.      Sincerely,    Des [place] MD

## 2020-03-11 ENCOUNTER — HEALTH MAINTENANCE LETTER (OUTPATIENT)
Age: 35
End: 2020-03-11

## 2020-11-06 ENCOUNTER — TELEPHONE (OUTPATIENT)
Dept: DERMATOLOGY | Facility: CLINIC | Age: 35
End: 2020-11-06

## 2020-11-10 NOTE — TELEPHONE ENCOUNTER
Prior Authorization Approval    Authorization Effective Date: 11/6/2020  Authorization Expiration Date: 12/31/2039  Medication: HUMIRA  Approved Dose/Quantity:   Reference #: [id number]   Insurance Company: [employer] ([facility] - Phone [phone] Fax [phone]  Expected CoPay:       CoPay Card Available:      Foundation Assistance Needed:    Which Pharmacy is filling the prescription (Not needed for infusion/clinic administered): [facility] - [place] [address]  Pharmacy Notified: Yes  Patient Notified: Yes

## 2020-11-25 ENCOUNTER — TELEPHONE (OUTPATIENT)
Dept: DERMATOLOGY | Facility: CLINIC | Age: 35
End: 2020-11-25

## 2020-11-25 NOTE — TELEPHONE ENCOUNTER
[facility] Call Center    Phone Message    May a detailed message be left on voicemail: yes     Reason for Call: Other: Pt called and would like to schedule a f/u with Dr. [place]. Please call her back. Thanks     Action Taken: Message routed to:  [facility] ([facility]): DERM    Travel Screening: Not Applicable

## 2020-12-10 NOTE — TELEPHONE ENCOUNTER
Pt called regarding below. Pt said she did not receive a call from us. Please call her back. Pt is okay with a detailed message if you cannot get a hold of her. Thanks

## 2021-01-03 ENCOUNTER — HEALTH MAINTENANCE LETTER (OUTPATIENT)
Age: 36
End: 2021-01-03

## 2021-01-12 DIAGNOSIS — L73.2 HIDRADENITIS SUPPURATIVA: ICD-10-CM

## 2021-01-14 RX ORDER — ADALIMUMAB 40MG/0.4ML
KIT SUBCUTANEOUS
Qty: 6 EACH | Refills: 4 | Status: SHIPPED | OUTPATIENT
Start: 2021-01-14 | End: 2021-04-08

## 2021-01-15 NOTE — TELEPHONE ENCOUNTER
Received refill request for Humira. Patient chart reviewed. Refill accepted. Rx routed to Dr. Lorenzo Lewis MD  Dermatology Resident  [facility]

## 2021-01-26 ENCOUNTER — VIRTUAL VISIT (OUTPATIENT)
Dept: DERMATOLOGY | Facility: CLINIC | Age: 36
End: 2021-01-26
Payer: COMMERCIAL

## 2021-01-26 DIAGNOSIS — L73.2 HIDRADENITIS SUPPURATIVA: Primary | ICD-10-CM

## 2021-01-26 PROCEDURE — 99213 OFFICE O/P EST LOW 20 MIN: CPT | Mod: 95 | Performed by: DERMATOLOGY

## 2021-01-26 ASSESSMENT — PAIN SCALES - GENERAL: PAINLEVEL: MILD PAIN (3)

## 2021-01-26 NOTE — NURSING NOTE
Sheri Pace's goals for this visit include:   Chief Complaint   Patient presents with     Derm Problem     Sheri is having consult for HS excision on inguinal folds        She requests these members of her care team be copied on today's visit information:     PCP: No primary care provider on file.    Referring Provider:  Des [place]ura MD  [address] [place] [address]    There were no vitals taken for this visit.    Do you need any medication refills at today's visit? No    Stacy Rebolledo LPN

## 2021-01-26 NOTE — PROGRESS NOTES
"[facility] Dermatology Note [phone]  Encounter Date: Jan 26, 2021  Store-and-Forward and Telephone. Location of teledermatologist: [facility] DERMATOLOGIC SURGERY CLINIC [place].  Start time: 1530. End time: 1540.    Dermatology Problem List:  1. 1.Hidradenitis suppurativa of the bilateral inguinal folds, Scott stage II  - started Humira 4/19/19  - Hibiclens  - past tx: dapsone 100 mg daily (suspended on 11/1/2018 due to severe headaches) and spironolactone 100mg daily (discontinued due to acne flare), BPO 10% wash  - placed a referral to derm surgery for excision  - ILK 10 9/12/19  2. Hypothyroidism  -Levothyroxine    HPI:  - Left inner thigh with 2-3 lesions that drain about every week  - Right inner thigh with 1 lesion that drains less often  - Did have surgery in the past for HS in 2012, said this went \"awful\" was admitted for 3 days. She had wide local excision of the entire groin fold area bilaterally.   - Has taken clindamycin an rifampin, would be ok with taking this again, although she felt that this was not helpful when she took it.   - Would like to schedule for this summer or next Christmas   - She works as a  and would like to coordinate with when she can take some time off.     ____________________________________________    Assessment & Plan:     # Hidradenitis Suppurativa    - Discussed the nature of the diagnosis/condition  - Discussed the treatment options, including the risks benefits and expectations of these options.   - Will schedule excision now for excision this summer. Will plan for a virtual appointment 6-8 weeks prior to touch base and initiate clindamycin / rifampin      Procedures Performed:    None    Follow-up: 6-8 weeks virtually, prior to scheduled date of excision.     Staff and Fellow:     Carols Stoner MD  PGY-6    Micrographic Surgery and Dermatologic Oncology Fellow  January 26, " 2021      ____________________________________________    CC: Derm Problem (Sheri is having consult for HS excision on inguinal folds )    HPI:  Ms. Sheri Pace is a(n) 35 year old female who presents today as a new patient for referral for HS excision.     Patient is otherwise feeling well, without additional concerns.    Labs:  NA    Physical Exam:  Vitals: There were no vitals taken for this visit.  SKIN: Teledermatology photos were reviewed; image quality and interpretability: acceptable. Image date: 1/26/2020.  - Over the bilateral inner thighs are scattered pink to red-brown papules and nodules with surrounding redundant skin.   - No other lesions of concern on areas examined.     Medications:  Current Outpatient Medications   Medication     adalimumab (HUMIRA *CF* PEN) 40 MG/0.4ML pen kit     adalimumab (HUMIRA PEN) 40 MG/0.8ML pen kit     chlorhexidine (HIBICLENS) 4 % liquid     levothyroxine (SYNTHROID/LEVOTHROID) 100 MCG tablet     norgestim-eth estrad triphasic (ORTHO TRI-CYCLEN, TRI-SPRINTEC) 0.18/0.215/0.25 MG-35 MCG per tablet     clindamycin (CLINDAMAX) 1 % topical gel     Current Facility-Administered Medications   Medication     triamcinolone acetonide (KENALOG-10) injection 40 mg      Past Medical/Surgical History:   There is no problem list on file for this patient.    No past medical history on file.    CC Des Ventura MD  [address] [place] [address] on close of this encounter.    Carlos Stoner MD  PGY-6    Micrographic Surgery and Dermatologic Oncology Fellow  January 26, 2021

## 2021-01-26 NOTE — PATIENT INSTRUCTIONS
[facility] Dermatology Visit    Thank you for allowing us to participate in your care. Your findings, instructions and follow-up plan are as follows:         When should I call my doctor?    If you are worsening or not improving, please, contact us or seek urgent care as noted below.     Who should I call with questions (adults)?    [facility] (adult and pediatric): [phone]     [facility] (adult): [phone]    For urgent needs outside of business hours call the [facility] at [phone] and ask for the dermatology resident on call    If this is a medical emergency and you are unable to reach an ER, Call 911      Who should I call with questions (pediatric)?  [facility]- Pediatric Dermatology  Dr. Karen Campuzano, Dr. Jayla Soliman, Dr. Ellen Friend, Tasia Whitehead, PA  Dr. Pippa Jaramillo, Dr. Mary Ann Painter & Dr. Ryder Bolden  Non Urgent  Nurse Triage Line; [phone]- Galilea and Kathleen RN Care Coordinators   Karla (/Complex ) [phone]    If you need a prescription refill, please contact your pharmacy. Refills are approved or denied by our Physicians during normal business hours, Monday through Fridays  Per office policy, refills will not be granted if you have not been seen within the past year (or sooner depending on your child's condition)    Scheduling Information:  Pediatric Appointment Scheduling and Call Center [phone]  Radiology Scheduling- [phone]  Sedation Unit Scheduling- [phone]  [place] Scheduling- General [phone]; Pediatric Dermatology [phone]  Main  Services: [phone]  Khmer: [phone]  Sammarinese: [phone]  Hmong/Kiswahili/Bulgarian: [phone]  Preadmission Nursing Department Fax Number: [phone] (Fax all pre-operative paperwork to this number)    For urgent matters arising during evenings,  weekends, or holidays that cannot wait for normal business hours please call [phone] and ask for the Dermatology Resident On-Call to be paged.

## 2021-01-26 NOTE — LETTER
"1/26/2021       RE: Sheri Pace  [address]  [place] [address]     Dear Colleague,    Thank you for referring your patient, Sheri Pace, to the [facility] DERMATOLOGIC SURGERY CLINIC [place] at [facility]. Please see a copy of my visit note below.    [facility] Dermatology Note [phone]  Encounter Date: Jan 26, 2021  Store-and-Forward and Telephone. Location of teledermatologist: [facility] DERMATOLOGIC SURGERY CLINIC [place].  Start time: 1530. End time: 1540.    Dermatology Problem List:  1. 1.Hidradenitis suppurativa of the bilateral inguinal folds, Scott stage II  - started Humira 4/19/19  - Hibiclens  - past tx: dapsone 100 mg daily (suspended on 11/1/2018 due to severe headaches) and spironolactone 100mg daily (discontinued due to acne flare), BPO 10% wash  - placed a referral to derm surgery for excision  - ILK 10 9/12/19  2. Hypothyroidism  -Levothyroxine    HPI:  - Left inner thigh with 2-3 lesions that drain about every week  - Right inner thigh with 1 lesion that drains less often  - Did have surgery in the past for HS in 2012, said this went \"awful\" was admitted for 3 days. She had wide local excision of the entire groin fold area bilaterally.   - Has taken clindamycin an rifampin, would be ok with taking this again, although she felt that this was not helpful when she took it.   - Would like to schedule for this summer or next Christmas   - She works as a  and would like to coordinate with when she can take some time off.     ____________________________________________    Assessment & Plan:     # Hidradenitis Suppurativa    - Discussed the nature of the diagnosis/condition  - Discussed the treatment options, including the risks benefits and expectations of these options.   - Will schedule excision now for excision this summer. Will plan for a virtual appointment 6-8 weeks prior to touch base and " initiate clindamycin / rifampin      Procedures Performed:    None    Follow-up: 6-8 weeks virtually, prior to scheduled date of excision.     Staff and Fellow:     Carlos Stoner MD  PGY-6    Micrographic Surgery and Dermatologic Oncology Fellow  January 26, 2021      ____________________________________________    CC: Derm Problem (Sheri is having consult for HS excision on inguinal folds )    HPI:  Ms. Sheri Pace is a(n) 35 year old female who presents today as a new patient for referral for HS excision.     Patient is otherwise feeling well, without additional concerns.    Labs:  NA    Physical Exam:  Vitals: There were no vitals taken for this visit.  SKIN: Teledermatology photos were reviewed; image quality and interpretability: acceptable. Image date: 1/26/2020.  - Over the bilateral inner thighs are scattered pink to red-brown papules and nodules with surrounding redundant skin.   - No other lesions of concern on areas examined.     Medications:  Current Outpatient Medications   Medication     adalimumab (HUMIRA *CF* PEN) 40 MG/0.4ML pen kit     adalimumab (HUMIRA PEN) 40 MG/0.8ML pen kit     chlorhexidine (HIBICLENS) 4 % liquid     levothyroxine (SYNTHROID/LEVOTHROID) 100 MCG tablet     norgestim-eth estrad triphasic (ORTHO TRI-CYCLEN, TRI-SPRINTEC) 0.18/0.215/0.25 MG-35 MCG per tablet     clindamycin (CLINDAMAX) 1 % topical gel     Current Facility-Administered Medications   Medication     triamcinolone acetonide (KENALOG-10) injection 40 mg      Past Medical/Surgical History:   There is no problem list on file for this patient.    No past medical history on file.    CC Des Ventura MD  [address] [place] [address] on close of this encounter.    Carlos Stoner MD  PGY-6    Micrographic Surgery and Dermatologic Oncology Fellow  January 26, 2021        Attestation signed by Musa Rincon MD at 2/2/2021 10:58 AM:  Attending Attestation:  I personally interviewed and examined the  patient.  The patient was discussed with the resident.  I reviewed the resident note and agree with the findings.  Any edits are below.    History: Pt with HS on Humira by Dr. CHRISTINE  Not seen in >1 year    PE: Scott 2 by history.  Flaring weekly on current therapy    A/P: HS -- on Humira.  Make sure she is plugged in with HS clinic for refills, etc.  Plan for antibiotics to start prior to surgery.      Musa Rincon M.D.  Professor  Director of Dermatologic Surgery  Department of Dermatology

## 2021-04-08 ENCOUNTER — APPOINTMENT (OUTPATIENT)
Dept: LAB | Facility: CLINIC | Age: 36
End: 2021-04-08
Payer: COMMERCIAL

## 2021-04-08 ENCOUNTER — OFFICE VISIT (OUTPATIENT)
Dept: DERMATOLOGY | Facility: CLINIC | Age: 36
End: 2021-04-08
Payer: COMMERCIAL

## 2021-04-08 VITALS — SYSTOLIC BLOOD PRESSURE: 111 MMHG | DIASTOLIC BLOOD PRESSURE: 76 MMHG | HEART RATE: 66 BPM

## 2021-04-08 DIAGNOSIS — L73.2 HIDRADENITIS SUPPURATIVA: ICD-10-CM

## 2021-04-08 DIAGNOSIS — T80.90XA INJECTION SITE REACTION, INITIAL ENCOUNTER: Primary | ICD-10-CM

## 2021-04-08 LAB
ALBUMIN SERPL-MCNC: 3.5 G/DL (ref 3.4–5)
ALP SERPL-CCNC: 66 U/L (ref 40–150)
ALT SERPL W P-5'-P-CCNC: 27 U/L (ref 0–50)
ANION GAP SERPL CALCULATED.3IONS-SCNC: 5 MMOL/L (ref 3–14)
AST SERPL W P-5'-P-CCNC: 14 U/L (ref 0–45)
BASOPHILS # BLD AUTO: 0.1 10E9/L (ref 0–0.2)
BASOPHILS NFR BLD AUTO: 0.7 %
BILIRUB SERPL-MCNC: 0.4 MG/DL (ref 0.2–1.3)
BUN SERPL-MCNC: 13 MG/DL (ref 7–30)
CALCIUM SERPL-MCNC: 9.2 MG/DL (ref 8.5–10.1)
CHLORIDE SERPL-SCNC: 105 MMOL/L (ref 94–109)
CO2 SERPL-SCNC: 28 MMOL/L (ref 20–32)
CREAT SERPL-MCNC: 0.8 MG/DL (ref 0.52–1.04)
DIFFERENTIAL METHOD BLD: NORMAL
EOSINOPHIL # BLD AUTO: 0.3 10E9/L (ref 0–0.7)
EOSINOPHIL NFR BLD AUTO: 2.6 %
ERYTHROCYTE [DISTWIDTH] IN BLOOD BY AUTOMATED COUNT: 12.3 % (ref 10–15)
GFR SERPL CREATININE-BSD FRML MDRD: >90 ML/MIN/{1.73_M2}
GLUCOSE SERPL-MCNC: 83 MG/DL (ref 70–99)
HCT VFR BLD AUTO: 41.3 % (ref 35–47)
HGB BLD-MCNC: 13.8 G/DL (ref 11.7–15.7)
IMM GRANULOCYTES # BLD: 0 10E9/L (ref 0–0.4)
IMM GRANULOCYTES NFR BLD: 0.2 %
LYMPHOCYTES # BLD AUTO: 2.7 10E9/L (ref 0.8–5.3)
LYMPHOCYTES NFR BLD AUTO: 27.5 %
MCH RBC QN AUTO: 30.7 PG (ref 26.5–33)
MCHC RBC AUTO-ENTMCNC: 33.4 G/DL (ref 31.5–36.5)
MCV RBC AUTO: 92 FL (ref 78–100)
MONOCYTES # BLD AUTO: 0.5 10E9/L (ref 0–1.3)
MONOCYTES NFR BLD AUTO: 5.4 %
NEUTROPHILS # BLD AUTO: 6.3 10E9/L (ref 1.6–8.3)
NEUTROPHILS NFR BLD AUTO: 63.6 %
NRBC # BLD AUTO: 0 10*3/UL
NRBC BLD AUTO-RTO: 0 /100
PLATELET # BLD AUTO: 297 10E9/L (ref 150–450)
POTASSIUM SERPL-SCNC: 4.2 MMOL/L (ref 3.4–5.3)
PROT SERPL-MCNC: 7.6 G/DL (ref 6.8–8.8)
RBC # BLD AUTO: 4.49 10E12/L (ref 3.8–5.2)
SODIUM SERPL-SCNC: 138 MMOL/L (ref 133–144)
WBC # BLD AUTO: 9.8 10E9/L (ref 4–11)

## 2021-04-08 PROCEDURE — 86481 TB AG RESPONSE T-CELL SUSP: CPT | Mod: 90 | Performed by: PATHOLOGY

## 2021-04-08 PROCEDURE — 85025 COMPLETE CBC W/AUTO DIFF WBC: CPT | Performed by: PATHOLOGY

## 2021-04-08 PROCEDURE — 99000 SPECIMEN HANDLING OFFICE-LAB: CPT | Performed by: PATHOLOGY

## 2021-04-08 PROCEDURE — 80053 COMPREHEN METABOLIC PANEL: CPT | Performed by: PATHOLOGY

## 2021-04-08 PROCEDURE — 84630 ASSAY OF ZINC: CPT | Mod: 90 | Performed by: PATHOLOGY

## 2021-04-08 PROCEDURE — 36415 COLL VENOUS BLD VENIPUNCTURE: CPT | Performed by: PATHOLOGY

## 2021-04-08 PROCEDURE — 82306 VITAMIN D 25 HYDROXY: CPT | Mod: 90 | Performed by: PATHOLOGY

## 2021-04-08 PROCEDURE — 99214 OFFICE O/P EST MOD 30 MIN: CPT | Performed by: DERMATOLOGY

## 2021-04-08 RX ORDER — CLINDAMYCIN HCL 300 MG
300 CAPSULE ORAL 2 TIMES DAILY
Qty: 180 CAPSULE | Refills: 0 | Status: SHIPPED | OUTPATIENT
Start: 2021-04-08 | End: 2023-11-03

## 2021-04-08 RX ORDER — ADALIMUMAB 40MG/0.4ML
KIT SUBCUTANEOUS
Qty: 6 EACH | Refills: 4 | OUTPATIENT
Start: 2021-04-08 | End: 2021-04-08

## 2021-04-08 RX ORDER — CLOBETASOL PROPIONATE 0.5 MG/G
CREAM TOPICAL 2 TIMES DAILY
Qty: 45 G | Refills: 0 | Status: SHIPPED | OUTPATIENT
Start: 2021-04-08 | End: 2023-03-16

## 2021-04-08 RX ORDER — PHENTERMINE HYDROCHLORIDE 37.5 MG/1
37.5 TABLET ORAL DAILY
COMMUNITY
Start: 2021-02-23 | End: 2023-11-03

## 2021-04-08 RX ORDER — BUPROPION HYDROCHLORIDE 100 MG/1
100 TABLET ORAL DAILY
COMMUNITY
Start: 2021-03-18 | End: 2023-11-03

## 2021-04-08 RX ORDER — ADALIMUMAB 40MG/0.4ML
KIT SUBCUTANEOUS
Qty: 6 EACH | Refills: 4 | Status: SHIPPED | OUTPATIENT
Start: 2021-04-08 | End: 2022-04-21

## 2021-04-08 RX ORDER — CLOBETASOL PROPIONATE 0.5 MG/G
CREAM TOPICAL 2 TIMES DAILY
Qty: 45 G | Refills: 0 | Status: SHIPPED | OUTPATIENT
Start: 2021-04-08 | End: 2021-04-08

## 2021-04-08 RX ORDER — NALTREXONE HYDROCHLORIDE 50 MG/1
50 TABLET, FILM COATED ORAL DAILY
COMMUNITY
Start: 2021-03-18 | End: 2023-11-03

## 2021-04-08 ASSESSMENT — PAIN SCALES - GENERAL: PAINLEVEL: MODERATE PAIN (5)

## 2021-04-08 NOTE — NURSING NOTE
Dermatology Rooming Note    Sheri S Link's goals for this visit include:   Chief Complaint   Patient presents with     Derm Problem     HS follow up. She states that things seem to be the same. She will be having surgery in June. She states that there is a new lesion on her arm.      Jose Clark, EMT

## 2021-04-08 NOTE — LETTER
4/8/2021       RE: Sheri Pace  [address]  [facility] [address]     Dear Colleague,    Thank you for referring your patient, Sheri Pace, to the [facility] DERMATOLOGY CLINIC [place] at [facility]. Please see a copy of my visit note below.    [facility] Dermatology Note     Dermatology Clinic  [facility]  [address] [place] [address]     Dermatology Problem List:  1.Hidradenitis suppurativa of the bilateral inguinal folds, Scott stage II  - started Humira 4/19/19  - Hibiclens  - past tx: dapsone 100 mg daily (suspended on 11/1/2018 due to severe headaches) and spironolactone 100mg daily (discontinued due to acne flare), BPO 10% wash  - placed a referral to derm surgery for excision  - ILK 10 9/12/19  2. Hypothyroidism  -Levothyroxine     Encounter George: April 8, 2021    LV: Dec 26, 2019     CC: follow-up HS     Interval Hx April 8, 2021  She has one new one that is not too severe and the old ones that keep flaring. Scheduled surgery for June.    Nurse Assessment Data 4/5/2021 4/8/2021   Over the past month, about how many recurrent or new boils have you had? - 2   Over the past week, how many dressing changes do you do each day? - 2   Over the past week, has your wound drainage been: - Mild   Rate your HS overall from 0-10 (0 = no disease, 10 = worst) over the past week:  - 5   Rate your pain score from 0-10 (0 = no disease, 10 = worst) for the most painful/symptomatic lesion in the past week:  - 5 - Moderate Pain   Over the past week, how much has HS influenced your quality of life? - slightly   Total DLQI Score 8 (moderate effect on patient's life) -     She has not had issues with wearing off of humira. HAs been having injection site reaction at site of injectioon.    - No symptoms or history concerning for TB    No known TB exposure    No recent travel to TB endemic  area   No chronic cough, hemoptysis, major weight changes or night sweats.   Not a healthcare worker   Not homeless and has not worked in a homeless shelter  Works as police [id number].    Past Medical History:   HS  Hypothyroidism     Social History:  The patient works as a  at a [employer] She has 3 children (all boys); the oldest is 18 years of age.     Family History:  Denies any family hx of HS.      Medications:  Current Outpatient Medications   Medication     adalimumab (HUMIRA *CF* PEN) 40 MG/0.4ML pen kit     adalimumab (HUMIRA PEN) 40 MG/0.8ML pen kit     buPROPion (WELLBUTRIN) 100 MG tablet     chlorhexidine (HIBICLENS) 4 % liquid     levothyroxine (SYNTHROID/LEVOTHROID) 100 MCG tablet     naltrexone (DEPADE/REVIA) 50 MG tablet     norgestim-eth estrad triphasic (ORTHO TRI-CYCLEN, TRI-SPRINTEC) 0.18/0.215/0.25 MG-35 MCG per tablet     phentermine (ADIPEX-P) 37.5 MG tablet     clindamycin (CLINDAMAX) 1 % topical gel     Current Facility-Administered Medications   Medication     triamcinolone acetonide (KENALOG-10) injection 40 mg            Allergies   Allergen Reactions     Penicillin G Rash      Review of Systems:  -Skin: As above in HPI. No additional skin concerns.     Physical exam:  GEN: This is a well developed, well-nourished female in no acute distress, in a pleasant mood.    SKIN      Impression/Plan:  1. Hidradenitis suppurativa, bilateral inguinal folds    Scott stage II, moderate with active tunnel in left groin and 1 nodule    Doing well on humira except continual flares in two chronic lesions. Schedule for June for surgery. Will give clindamycin 300mg BID for 1 month before and then as directed by surgery. No rifampin due to interaction with birth control.     CBC, CMP and Quant gold today    Patient to continue Hibiclens wash daily        Follow up in a year for refills.        Staff Involved:     Des Ventura MD, FAAD    Departments of Internal Medicine and  Dermatology  [facility]  [phone]

## 2021-04-08 NOTE — PROGRESS NOTES
[facility] Dermatology Note     Dermatology Clinic  [facility]  [address] [place] [address]     Dermatology Problem List:  1.Hidradenitis suppurativa of the bilateral inguinal folds, Scott stage II  - started Humira 4/19/19  - Hibiclens  - past tx: dapsone 100 mg daily (suspended on 11/1/2018 due to severe headaches) and spironolactone 100mg daily (discontinued due to acne flare), BPO 10% wash  - placed a referral to derm surgery for excision  - ILK 10 9/12/19  2. Hypothyroidism  -Levothyroxine     Encounter George: April 8, 2021    LV: Dec 26, 2019     CC: follow-up HS     Interval Hx April 8, 2021  She has one new one that is not too severe and the old ones that keep flaring. Scheduled surgery for June.    Nurse Assessment Data 4/5/2021 4/8/2021   Over the past month, about how many recurrent or new boils have you had? - 2   Over the past week, how many dressing changes do you do each day? - 2   Over the past week, has your wound drainage been: - Mild   Rate your HS overall from 0-10 (0 = no disease, 10 = worst) over the past week:  - 5   Rate your pain score from 0-10 (0 = no disease, 10 = worst) for the most painful/symptomatic lesion in the past week:  - 5 - Moderate Pain   Over the past week, how much has HS influenced your quality of life? - slightly   Total DLQI Score 8 (moderate effect on patient's life) -     She has not had issues with wearing off of humira. HAs been having injection site reaction at site of injectioon.    - No symptoms or history concerning for TB    No known TB exposure    No recent travel to TB endemic area   No chronic cough, hemoptysis, major weight changes or night sweats.   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    Past Medical History:   HS  Hypothyroidism     Social History:  The patient works as a  at a [employer]. She has 3  children (all boys); the oldest is 18 years of age.     Family History:  Denies any family hx of HS.      Medications:  Current Outpatient Medications   Medication     adalimumab (HUMIRA *CF* PEN) 40 MG/0.4ML pen kit     adalimumab (HUMIRA PEN) 40 MG/0.8ML pen kit     buPROPion (WELLBUTRIN) 100 MG tablet     chlorhexidine (HIBICLENS) 4 % liquid     levothyroxine (SYNTHROID/LEVOTHROID) 100 MCG tablet     naltrexone (DEPADE/REVIA) 50 MG tablet     norgestim-eth estrad triphasic (ORTHO TRI-CYCLEN, TRI-SPRINTEC) 0.18/0.215/0.25 MG-35 MCG per tablet     phentermine (ADIPEX-P) 37.5 MG tablet     clindamycin (CLINDAMAX) 1 % topical gel     Current Facility-Administered Medications   Medication     triamcinolone acetonide (KENALOG-10) injection 40 mg            Allergies   Allergen Reactions     Penicillin G Rash      Review of Systems:  -Skin: As above in HPI. No additional skin concerns.     Physical exam:  GEN: This is a well developed, well-nourished female in no acute distress, in a pleasant mood.    SKIN      Impression/Plan:  1. Hidradenitis suppurativa, bilateral inguinal folds    Scott stage II, moderate with active tunnel in left groin and 1 nodule    Doing well on humira except continual flares in two chronic lesions. Schedule for June for surgery. Will give clindamycin 300mg BID for 1 month before and then as directed by surgery. No rifampin due to interaction with birth control.     CBC, CMP and Quant gold today    Patient to continue Hibiclens wash daily        Follow up in a year for refills.        Staff Involved:     Des Ventura MD, FAAD    Departments of Internal Medicine and Dermatology  [facility]  [phone]

## 2021-04-08 NOTE — PATIENT INSTRUCTIONS
Continue humira  For injection site reactions  - Benadryl + tylenol 650mg 30 min prior to injection  - After injection apply clobetasol cream and ice or 5 min

## 2021-04-09 LAB — DEPRECATED CALCIDIOL+CALCIFEROL SERPL-MC: 39 UG/L (ref 20–75)

## 2021-04-10 LAB — ZINC SERPL-MCNC: 69 UG/DL (ref 60–120)

## 2021-04-12 LAB
GAMMA INTERFERON BACKGROUND BLD IA-ACNC: 0.04 IU/ML
M TB IFN-G CD4+ BCKGRND COR BLD-ACNC: 9.96 IU/ML
M TB TUBERC IFN-G BLD QL: NEGATIVE
MITOGEN IGNF BCKGRD COR BLD-ACNC: 0 IU/ML
MITOGEN IGNF BCKGRD COR BLD-ACNC: 0 IU/ML

## 2021-04-25 ENCOUNTER — HEALTH MAINTENANCE LETTER (OUTPATIENT)
Age: 36
End: 2021-04-25

## 2021-04-28 ENCOUNTER — MYC MEDICAL ADVICE (OUTPATIENT)
Dept: DERMATOLOGY | Facility: CLINIC | Age: 36
End: 2021-04-28

## 2021-04-28 ENCOUNTER — VIRTUAL VISIT (OUTPATIENT)
Dept: DERMATOLOGY | Facility: CLINIC | Age: 36
End: 2021-04-28
Payer: COMMERCIAL

## 2021-04-28 DIAGNOSIS — L73.2 HIDRADENITIS SUPPURATIVA: Primary | ICD-10-CM

## 2021-04-28 PROCEDURE — 99207 PR NO BILLABLE SERVICE THIS VISIT: CPT | Mod: 95 | Performed by: DERMATOLOGY

## 2021-04-28 ASSESSMENT — PAIN SCALES - GENERAL: PAINLEVEL: NO PAIN (0)

## 2021-04-28 NOTE — LETTER
Date:May 16, 2021      Provider requested that no letter be sent. Do not send.       [facility]

## 2021-04-28 NOTE — NURSING NOTE
Dermatology Rooming Note    Sheri S Link's goals for this visit include:   Chief Complaint   Patient presents with     Derm Problem     Sheri states there has been no change with her HS spots     Stephan Loja, CMA

## 2021-04-28 NOTE — PROGRESS NOTES
I was not able to get a hold of the patient today    We ran behind in afternoon clinic and did not contact her until around 3:30 PM    Sent [employer]a [employer] message and left voicemail.     Carlos Stoner MD

## 2021-04-28 NOTE — LETTER
4/28/2021       RE: Sheri Pace  [address]  [place] [address]     Dear Colleague,    Thank you for referring your patient, Sheri Pace, to the [facility] DERMATOLOGIC SURGERY CLINIC [place] at [facility]. Please see a copy of my visit note below.    I was not able to get a hold of the patient today    We ran behind in afternoon clinic and did not contact her until around 3:30 PM    Sent [employer] message and left voicemail.     Carlos Stoner MD      Attestation signed by Musa Rincon MD at 5/11/2021  9:47 AM:  Musa Rincon MD        Again, thank you for allowing me to participate in the care of your patient.      Sincerely,    Musa Rincon MD

## 2021-04-29 NOTE — TELEPHONE ENCOUNTER
I called and spoke with the patient. I will be sharing the procedure codes with her to ensure that the procedure will be covered by her insurance company.     Carlos Stoner MD  April 29, 2021  3:32 PM

## 2021-06-09 ENCOUNTER — OFFICE VISIT (OUTPATIENT)
Dept: DERMATOLOGY | Facility: CLINIC | Age: 36
End: 2021-06-09
Payer: COMMERCIAL

## 2021-06-09 DIAGNOSIS — L73.2 HIDRADENITIS SUPPURATIVA: ICD-10-CM

## 2021-06-09 DIAGNOSIS — G89.18 POST-OPERATIVE PAIN: Primary | ICD-10-CM

## 2021-06-09 PROCEDURE — 11462 EXC SKN HDRDNT ING SMPL/NTRM: CPT | Mod: 50 | Performed by: DERMATOLOGY

## 2021-06-09 RX ORDER — TRAMADOL HYDROCHLORIDE 50 MG/1
50 TABLET ORAL EVERY 6 HOURS PRN
Qty: 6 TABLET | Refills: 0 | Status: SHIPPED | OUTPATIENT
Start: 2021-06-09 | End: 2021-06-11

## 2021-06-09 ASSESSMENT — PAIN SCALES - GENERAL: PAINLEVEL: SEVERE PAIN (6)

## 2021-06-09 NOTE — LETTER
6/9/2021       RE: Sheri Pace  [address]  [facility] [address]     Dear Colleague,    Thank you for referring your patient, Sheri Pace, to the [facility] DERMATOLOGIC SURGERY CLINIC [place] at [facility]. Please see a copy of my visit note below.    DERMATOLOGY EXCISION PROCEDURE NOTE    Dermatology Problem List:  1.Hidradenitis suppurativa of the bilateral inguinal folds, Scott stage II  - Excision of persistent nodules of right and left medial thighs 6/9/2021  - started Humira 4/19/19  - Hibiclens  - past tx: dapsone 100 mg daily (suspended on 11/1/2018 due to severe headaches) and spironolactone 100mg daily (discontinued due to acne flare), BPO 10% wash  - placed a referral to derm surgery for excision  - ILK 10 9/12/19  2. Hypothyroidism  -Levothyroxine    NAME OF PROCEDURE: Excision   Staff surgeon: Mckenzie  Resident: Georgiana  Scrub Nurse: Corrina    PRE-OPERATIVE DIAGNOSIS:  HS  POST-OPERATIVE DIAGNOSIS: Same   LOCATION: Right and left medial thighs  FINAL EXCISION SIZE(DEFECT SIZE): 5.0 x 3.0 cm (Left) and 3.5 x 2.5 cm (Right)  MARGIN: NA  FINAL REPAIR LENGTH: NA, left to heal secondarily  ANESTHESIA: 1% lidocaine with epinephrine      INDICATIONS: This patient presented with a two areas of HS plaques with tender nodules, cysts and draining sinus tracts. The area on the right thigh measures 5.0 x 3.0 and the area on the right measured 3.5 x 2.5 cm.  Excision was indicated. We discussed the principles of treatment and most likely complications including scarring, bleeding, infection, incomplete excision, wound dehiscence, pain, nerve damage, and recurrence. Informed consent was obtained and the patient underwent the procedure as follows:    PROCEDURE: The patient was taken to the operative suite. Time-out was performed.  The treatment area was anesthetized with 1% lidocaine with epinephrine. The area was prepped with Chlorhexidine and  rinsed with sterile saline and draped with sterile towels. The lesion was delineated and excised down to subcutaneous fat in a elliptical manner. Hemostasis was obtained by electrocoagulation.     EVALUATION OF MOHS DEFECT FOR RECONSTRUCTION    The patient is status post hidradenitis surgery.  The surgical site was examined with attention to normal anatomic and functional relationships.  After consideration and discussion of multiple options with the patient, it was determined that healing by second intention would offer the best chance for preservation/restoration of all normal anatomic and functional relationships.  The patient verbalized understanding and agrees with this plan. It is also understood that should second intention healing be sub-optimal, additional procedures such as scar revision, steroid injection or dermabrasion may be recommended.    The open wound was cleaned with chlorhexidine and rinsed with sterile saline and dressed with a bulky non-stick dressing.     Estimated blood loss was less than 10 ml for all surgical sites. A sterile pressure dressing was applied and wound care instructions, with a written handout, were given. The patient was discharged from the [facility] Center alert and ambulatory.    Dr. Rincon was immediately available for the entire surgery and was physicially present for the key portions of the procedure.    Anatomic Pathology Results: pending    Clinical Follow-Up: 1 week for wound check     Areas left to granulate  Discussed wound care with patient - chlorhexidine body wash once daily, sitz bath several times weekly (1/4 cup bleach in 5-6 inches of bath water x 15 min)    Tramadol provided for pain control. Discussed staggered Tylenol and ibuprofen     Follow up 2 weeks for wound check, virtually    Carlos Stoner MD  PGY-6    Micrographic Surgery and Dermatologic Oncology Fellow  June 9, 2021        Attestation signed by Musa Rincon MD at 6/21/2021  4:29  PM:    Attending attestation:  I was present for key elements of the procedure and immediately available for all other portions of the procedure.  I have reviewed the note and edited it as necessary.    Musa Rincon M.D.  Professor  Director of Dermatologic Surgery  Department of Dermatology  [facility]    Dermatology Surgery Clinic  [facility]  [address] [place], [place] [id number]

## 2021-06-09 NOTE — NURSING NOTE
Chief Complaint   Patient presents with     Derm Problem     pt states she is here for a excision on left side      Grecia Godwin MA

## 2021-06-09 NOTE — PATIENT INSTRUCTIONS
"Post Operative Care for Granulating Site:  After your surgery, a pressure bandage will be placed over the area. This will help prevent bleeding. Please follow these instructions as they will help you to prevent complications as your wound heals.  For the First 24 hours After Surgery:  1. Leave the pressure bandage on and keep it dry. If it should come loose, you may retape it, but do not take it off.  2. Relax and take it easy. Do not do any vigorous exercise, heavy lifting, or bending forward. This could cause the wound to bleed.  3. Post-operative pain is usually mild. You may take plain or extra strength Tylenol every 4 hours as needed. Do not take any medicine that contains aspirin, ibuprofen or motrin.  Avoid alcohol and vitamin E as these may increase your tendency to bleed.  4. You may put an ice pack around the bandaged area for 20 minutes every 2-3 hours. This may help reduce swelling, bruising, and pain. Make sure the ice pack is waterproof so that the pressure bandage does not get wet.   5. You may see a small amount of drainage or blood on your pressure bandage. This is normal. However, if drainage or bleeding continues or saturates the bandage, you will need to apply firm pressure over the bandage with a washcloth for 15 minutes. If bleeding continues after applying pressure for 15 minutes, apply an ice pack to the bandaged area for 15 minutes. If bleeding still continues, go to the nearest emergency room.  24 Hours After Surgery  Carefully remove the bandage and start daily wound care and dressing changes. You may also now shower and get the wound wet. Use caution when washing the wound, be gentle.  Daily Wound Care:    Wash with mild soap and water every day until wound appears well-healed.  Rinse well and pat dry.    Apply Vaseline over site with a Q-tip and Re-apply a dressing until wound appears well-healed.  A well healed wound is \"pinked over\" with a shiny surface.  When area is \"pinked over\" it " is no longer necessary to apply Vaseline.      Call Us If:    You have pain that is not controlled with Tylenol.    You have signs or symptoms of an infection, such as: fever over 100 degrees F, redness, warmth, or foul-smelling or yellow/creamy drainage from the wound.    Who should I call with questions?    [facility]: [phone]     [facility]: [phone]    For urgent needs outside of business hours call the [facility] at [phone] and ask for the dermatology resident on call

## 2021-06-09 NOTE — PROGRESS NOTES
DERMATOLOGY EXCISION PROCEDURE NOTE    Dermatology Problem List:  1.Hidradenitis suppurativa of the bilateral inguinal folds, Scott stage II  - Excision of persistent nodules of right and left medial thighs 6/9/2021  - started Humira 4/19/19  - Hibiclens  - past tx: dapsone 100 mg daily (suspended on 11/1/2018 due to severe headaches) and spironolactone 100mg daily (discontinued due to acne flare), BPO 10% wash  - placed a referral to derm surgery for excision  - ILK 10 9/12/19  2. Hypothyroidism  -Levothyroxine    NAME OF PROCEDURE: Excision   Staff surgeon: Mckenzie  Resident: Georgiana  Scrub Nurse: Corrina    PRE-OPERATIVE DIAGNOSIS:  HS  POST-OPERATIVE DIAGNOSIS: Same   LOCATION: Right and left medial thighs  FINAL EXCISION SIZE(DEFECT SIZE): 5.0 x 3.0 cm (Left) and 3.5 x 2.5 cm (Right)  MARGIN: NA  FINAL REPAIR LENGTH: NA, left to heal secondarily  ANESTHESIA: 1% lidocaine with epinephrine      INDICATIONS: This patient presented with a two areas of HS plaques with tender nodules, cysts and draining sinus tracts. The area on the right thigh measures 5.0 x 3.0 and the area on the right measured 3.5 x 2.5 cm.  Excision was indicated. We discussed the principles of treatment and most likely complications including scarring, bleeding, infection, incomplete excision, wound dehiscence, pain, nerve damage, and recurrence. Informed consent was obtained and the patient underwent the procedure as follows:    PROCEDURE: The patient was taken to the operative suite. Time-out was performed.  The treatment area was anesthetized with 1% lidocaine with epinephrine. The area was prepped with Chlorhexidine and rinsed with sterile saline and draped with sterile towels. The lesion was delineated and excised down to subcutaneous fat in a elliptical manner. Hemostasis was obtained by electrocoagulation.     EVALUATION OF MOHS DEFECT FOR RECONSTRUCTION    The patient is status post hidradenitis surgery.  The surgical site was  examined with attention to normal anatomic and functional relationships.  After consideration and discussion of multiple options with the patient, it was determined that healing by second intention would offer the best chance for preservation/restoration of all normal anatomic and functional relationships.  The patient verbalized understanding and agrees with this plan. It is also understood that should second intention healing be sub-optimal, additional procedures such as scar revision, steroid injection or dermabrasion may be recommended.    The open wound was cleaned with chlorhexidine and rinsed with sterile saline and dressed with a bulky non-stick dressing.     Estimated blood loss was less than 10 ml for all surgical sites. A sterile pressure dressing was applied and wound care instructions, with a written handout, were given. The [facility] alert and ambulatory.    Dr. Rincon was immediately available for the entire surgery and was physicially present for the key portions of the procedure.    Anatomic Pathology Results: pending    Clinical Follow-Up: 1 week for wound check     Areas left to granulate  Discussed wound care with patient - chlorhexidine body wash once daily, sitz bath several times weekly (1/4 cup bleach in 5-6 inches of bath water x 15 min)    Tramadol provided for pain control. Discussed staggered Tylenol and ibuprofen     Follow up 2 weeks for wound check, virtually    Carlos Stoner MD  PGY-6    Micrographic Surgery and Dermatologic Oncology Fellow  June 9, 2021

## 2021-06-16 ENCOUNTER — VIRTUAL VISIT (OUTPATIENT)
Dept: DERMATOLOGY | Facility: CLINIC | Age: 36
End: 2021-06-16
Payer: COMMERCIAL

## 2021-06-16 DIAGNOSIS — Z51.89 VISIT FOR WOUND CHECK: Primary | ICD-10-CM

## 2021-06-16 PROCEDURE — 99212 OFFICE O/P EST SF 10 MIN: CPT | Mod: 95 | Performed by: DERMATOLOGY

## 2021-06-16 ASSESSMENT — PAIN SCALES - GENERAL: PAINLEVEL: NO PAIN (0)

## 2021-06-16 NOTE — LETTER
6/16/2021       RE: Sheri Pace  [address]  [facility] [address]     Dear Colleague,    Thank you for referring your patient, Sheri Pace, to the [facility] [place] at [facility]. Please see a copy of my visit note below.    [facility] Dermatology Note  Encounter Date: Jun 16, 2021  Store-and-Forward and Telephone ([phone] ). Location of teledermatologist: [facility] [place].  Start time: 1515. End time: 1520.    Dermatology Problem List:  1.Hidradenitis suppurativa of the bilateral inguinal folds, Scott stage II  - Excision of persistent nodules of right and left medial thighs 6/9/2021  - started Humira 4/19/19  - Hibiclens  - past tx: dapsone 100 mg daily (suspended on 11/1/2018 due to severe headaches) and spironolactone 100mg daily (discontinued due to acne flare), BPO 10% wash  - placed a referral to derm surgery for excision  - ILK 10 9/12/19  2. Hypothyroidism  -Levothyroxine    ____________________________________________    Assessment & Plan:     1.Hidradenitis suppurativa of the bilateral inguinal folds, Scott stage II  - Excision of persistent nodules of right and left medial thighs 6/9/2021  - Healing very well overall, wound remains clean without significant pain or drainage.     Procedures Performed:    None    Follow-up: 1 month     Staff and Fellow:     Carlos Stoner MD    ____________________________________________    CC: Derm Problem (one week follow up, no concerns. )    HPI:  Ms. Sheri Pace is a(n) 35 year old female who presents today for follow-up  for wound check following HS surgery of the bilateral groin folds.     The patient reports no concerns.     Patient is otherwise feeling well, without additional skin concerns.    Labs Reviewed:  N/A    Physical Exam:  Vitals: There were no vitals taken for this visit.  SKIN:  Teledermatology photos were reviewed; image quality and interpretability: acceptable. Image date: 6/16/2021.  - Over the bilateral groin folds are open wounds healing by secondary intention without obvious signs of infection.   - No other lesions of concern on areas examined.     Medications:  Current Outpatient Medications   Medication     adalimumab (HUMIRA *CF* PEN) 40 MG/0.4ML pen kit     chlorhexidine (HIBICLENS) 4 % liquid     clindamycin (CLEOCIN) 300 MG capsule     clindamycin (CLINDAMAX) 1 % topical gel     clobetasol (TEMOVATE) 0.05 % external cream     levothyroxine (SYNTHROID/LEVOTHROID) 100 MCG tablet     norgestim-eth estrad triphasic (ORTHO TRI-CYCLEN, TRI-SPRINTEC) 0.18/0.215/0.25 MG-35 MCG per tablet     phentermine (ADIPEX-P) 37.5 MG tablet     buPROPion (WELLBUTRIN) 100 MG tablet     naltrexone (DEPADE/REVIA) 50 MG tablet     Current Facility-Administered Medications   Medication     triamcinolone acetonide (KENALOG-10) injection 40 mg      Past Medical/Surgical History:   There is no problem list on file for this patient.    No past medical history on file.    CC No referring provider defined for this encounter. on close of this encounter.    Attestation signed by Musa Rincon MD at 6/29/2021  9:18 AM:  Attending Attestation:  I personally interviewed and examined the patient.  The patient was discussed with the resident.  I reviewed the resident note and agree with the findings.  Any edits are below.    History: Photos reviewed.  Healing well    PE: Well healing wounds    A/P: HS -- Continue wound care.  RTC 4 months.      Musa Rincon M.D.  Professor  Director of Dermatologic Surgery  Department of Dermatology

## 2021-06-16 NOTE — PROGRESS NOTES
[facility] Dermatology Note  Encounter Date: Jun 16, 2021  Store-and-Forward and Telephone ([phone] ). Location of teledermatologist: [facility] [place].  Start time: 1515. End time: 1520.    Dermatology Problem List:  1.Hidradenitis suppurativa of the bilateral inguinal folds, Scott stage II  - Excision of persistent nodules of right and left medial thighs 6/9/2021  - started Humira 4/19/19  - Hibiclens  - past tx: dapsone 100 mg daily (suspended on 11/1/2018 due to severe headaches) and spironolactone 100mg daily (discontinued due to acne flare), BPO 10% wash  - placed a referral to derm surgery for excision  - ILK 10 9/12/19  2. Hypothyroidism  -Levothyroxine    ____________________________________________    Assessment & Plan:     1.Hidradenitis suppurativa of the bilateral inguinal folds, Scott stage II  - Excision of persistent nodules of right and left medial thighs 6/9/2021  - Healing very well overall, wound remains clean without significant pain or drainage.     Procedures Performed:    None    Follow-up: 1 month     Staff and Fellow:     Carlos Stoner MD    ____________________________________________    CC: Derm Problem (one week follow up, no concerns. )    HPI:  Ms. Sheri Pace is a(n) 35 year old female who presents today for follow-up  for wound check following HS surgery of the bilateral groin folds.     The patient reports no concerns.     Patient is otherwise feeling well, without additional skin concerns.    Labs Reviewed:  N/A    Physical Exam:  Vitals: There were no vitals taken for this visit.  SKIN: Teledermatology photos were reviewed; image quality and interpretability: acceptable. Image date: 6/16/2021.  - Over the bilateral groin folds are open wounds healing by secondary intention without obvious signs of infection.   - No other lesions of concern on areas examined.     Medications:  Current Outpatient Medications    Medication     adalimumab (HUMIRA *CF* PEN) 40 MG/0.4ML pen kit     chlorhexidine (HIBICLENS) 4 % liquid     clindamycin (CLEOCIN) 300 MG capsule     clindamycin (CLINDAMAX) 1 % topical gel     clobetasol (TEMOVATE) 0.05 % external cream     levothyroxine (SYNTHROID/LEVOTHROID) 100 MCG tablet     norgestim-eth estrad triphasic (ORTHO TRI-CYCLEN, TRI-SPRINTEC) 0.18/0.215/0.25 MG-35 MCG per tablet     phentermine (ADIPEX-P) 37.5 MG tablet     buPROPion (WELLBUTRIN) 100 MG tablet     naltrexone (DEPADE/REVIA) 50 MG tablet     Current Facility-Administered Medications   Medication     triamcinolone acetonide (KENALOG-10) injection 40 mg      Past Medical/Surgical History:   There is no problem list on file for this patient.    No past medical history on file.    CC No referring provider defined for this encounter. on close of this encounter.

## 2021-06-23 NOTE — TELEPHONE ENCOUNTER
Message sent to pt via [employer].    Bilateral Helical Rim Advancement Flap Text: The defect edges were debeveled with a #15 blade scalpel.  Given the location of the defect and the proximity to free margins (helical rim) a bilateral helical rim advancement flap was deemed most appropriate.  Using a sterile surgical marker, the appropriate advancement flaps were drawn incorporating the defect and placing the expected incisions between the helical rim and antihelix where possible.  The area thus outlined was incised through and through with a #15 scalpel blade.  With a skin hook and iris scissors, the flaps were gently and sharply undermined and freed up.

## 2021-10-10 ENCOUNTER — HEALTH MAINTENANCE LETTER (OUTPATIENT)
Age: 36
End: 2021-10-10

## 2022-01-13 NOTE — TELEPHONE ENCOUNTER
PA Initiation    Medication: HUMIRA  Insurance Company: [employer] ([facility]) - Phone [phone] Fax [phone]  Pharmacy Filling the Rx: [facility] - [place], [place] - [facility] [address]  Filling Pharmacy Phone: [phone]  Filling Pharmacy Fax: [phone]  Start Date: 11/6/2020       72

## 2022-01-26 NOTE — TELEPHONE ENCOUNTER
----- Message from Tabby Gibbs MD sent at 11/7/2018  9:48 PM CST -----  Regarding: RE: Laser hair removal  Are you okay with [facility] sending in this letter below on your behalf? If, so, please, sign    To Whom This May Concern,     We are writing to request coverage of laser hair removal for severe hidradenitis suppartiva. This patient has symptoms of including intermittent flares in the groin.     Hidradenitis responds well to laser hair removal as it is a follicular process. We would expect 1 treatments every 3- 6 weeks for a maximum of 12 treatments.     The CPT Code Description utilized would be 22850: Unlisted procedure, skin, mucous membrane and subcutaneous tissue. Estimated cost is $160 per session         This patient is currently shaving, which causes irritation and may be playing a role in new lesions. The only FDA covered treatment for this condition is weekly humira. In addition to being more invasive, the cost of this intervention is much larger.         We strive to provide our patient with outstanding care. Therefore, I request you please contact me at [phone] if you have any questions regarding coverage or our treatment rational.     Des Ventura MD      Department of Dermatology   [employer]     ----- Message -----     From: Des Ventura MD     Sent: 11/1/2018   1:17 PM       To: Tabby Gibbs MD  Subject: Laser hair removal                               Can we send a letter to her insurance to see if they will cover laser hair removal.     
FC - please submit to insurance.  Marie pSencer RN    
Letter and office visit submitted 11/9  
NON CCU-TELE

## 2022-04-19 DIAGNOSIS — L73.2 HIDRADENITIS SUPPURATIVA: Primary | ICD-10-CM

## 2022-04-21 ENCOUNTER — MYC MEDICAL ADVICE (OUTPATIENT)
Dept: DERMATOLOGY | Facility: CLINIC | Age: 37
End: 2022-04-21
Payer: COMMERCIAL

## 2022-04-21 RX ORDER — ADALIMUMAB 40MG/0.4ML
KIT SUBCUTANEOUS
Qty: 6 EACH | Refills: 0 | Status: SHIPPED | OUTPATIENT
Start: 2022-04-21 | End: 2022-07-28

## 2022-04-21 NOTE — TELEPHONE ENCOUNTER
adalimumab (HUMIRA *CF* PEN) 40 MG/0.4ML pen kit    Med not on refill protocol  Please send new order to pharmacy.  Last order they a delete date for April.  So they can not use it.      Thank you for sending a new order over for Pt care.       Brittni Dawson RN  Central Triage Red Flags/Med Refills

## 2022-04-21 NOTE — TELEPHONE ENCOUNTER
Received refill request as DORIS. Chart (including notes and pertinent labs) reviewed, refill appropriate. Patient does need a repeat quant gold, ordered. Attending Dr. Ventura CC'd as an FYI.      Jenelle Sanchez MD (PGY4)  Dermatology Resident

## 2022-05-21 ENCOUNTER — HEALTH MAINTENANCE LETTER (OUTPATIENT)
Age: 37
End: 2022-05-21

## 2022-07-20 DIAGNOSIS — L73.2 HIDRADENITIS SUPPURATIVA: ICD-10-CM

## 2022-07-22 NOTE — TELEPHONE ENCOUNTER
adalimumab (HUMIRA *CF* PEN) 40 MG/0.4ML pen kit 6 each 0 4/21/2022           Last Written Prescription Date:  4-  Last Fill Quantity: 6 EACH,   # refills: 0  Last Office Visit : 6-  Future Office visit:  NONE    Routing refill request to provider for review/approval because:  Not on protocol.  NOT SEE IN OVER A YEAR AND NO SCHEDULED APPOINTMENT    Scheduling has been notified to contact the pt for appointment.      Kathleen M Doege RN

## 2022-07-23 RX ORDER — ADALIMUMAB 40MG/0.4ML
KIT SUBCUTANEOUS
Qty: 6 EACH | Refills: 0 | OUTPATIENT
Start: 2022-07-23

## 2022-07-28 DIAGNOSIS — L73.2 HIDRADENITIS SUPPURATIVA: ICD-10-CM

## 2022-07-28 RX ORDER — ADALIMUMAB 40MG/0.4ML
KIT SUBCUTANEOUS
Qty: 6 EACH | Refills: 7 | Status: SHIPPED | OUTPATIENT
Start: 2022-07-28 | End: 2022-08-11

## 2022-08-08 NOTE — PROGRESS NOTES
[facility] Dermatology Note  Encounter Date: Aug 11, 2022  Telephone [phone]. Location of teledermatologist: [facility] [place].   Start time: 7:20 pm.   End time: 7:27 pm.    Dermatology Problem List:  1.Hidradenitis suppurativa of the bilateral inguinal folds, Scott stage II  - Excision of persistent nodules of right and left medial thighs 6/9/2021  - current tx: Humira (started 4/19/19)  - past tx: dapsone 100 mg daily (suspended on 11/1/2018 due to severe headaches), spironolactone 100mg daily (discontinued due to acne flare), BPO 10% wash, ILK 10 9/12/19, Hibiclens (stopped 2021)  - COVID-19 vaccine and 2 boosters (as of 8/11/22)   - Quantiferon neg 7/26/22  2. Hypothyroidism  -Levothyroxine 100 mcg     ____________________________________________    Assessment & Plan:     # Hidradenitis suppurativa of the bilateral inguinal folds, Scott stage II  - Excision of persistent nodules of right and left medial thighs 6/9/2021   - Impression that excision sites are healing very well overall   - Continue Humira  - Start Resorcinol cream twice a day for 30 days for probelmnatic areas and then twice a day as needed for flares  - Discussed recommended vaccines   - Pneumonia vaccine   - Shingles vaccine x2    Follow-up in 6 months virtually    Staff and Scribe:       Scribe Disclosure  I, Ame Sanches, am serving as a scribe to document services personally performed by Dr. Des Ventura MD, based on data collection and the provider's statements to me.    Provider Disclosure:   The documentation recorded by the scribe accurately reflects the services I personally performed and the decisions made by me.    Des Ventura MD, FAAD    Departments of Internal Medicine and Dermatology  [facility]  [phone]    ____________________________________________    CC:  Follow-up HS    HPI:  Ms. Sheri HARRIS Link is a(n) 36 year old female who  presents today as a return patient for hidradenitis suppurativa (HS) follow-up. Last seen in dermatology by Dr. Rincon on 6/16/2021, at which time excision sites were healing well.    Today, Ms. Pace says she is doing well in general. She reports two new flare ups due to lack of access to Humira. She went without Humira for two weeks because of a lapse in renewal. She last injected Humira this past Monday (8/8/22) and feels that her HS is improving. One of the flare ups is inside the left gluteal fold (pain rating 5) and the second area is on the right inguinal fold (pain rating 5). She reports that no other areas are currently bothering her at this time. She is not interested in discussing pain management options at this time due to her current low pain levels.    She is not currently using any bandages and is not experiencing any drainage. She showers once daily. Her current medications include Humira (started 4/19/19), levothyroxine 100 mcg, and norgestim-eth estrad triphasic (BCP) one tablet daily. She is interested in discussing other forms of birth control. Her primary would like her to switch to another form because of the length of time she has taken BCP. She is concerned about potential flare ups and side effects of new birth controls and would like to discuss further. She stopped Hibiclens in 2021.    Patient is otherwise feeling well, without additional skin concerns.    HS Nurse Assessment    Nurse Assessment Data 4/5/2021 4/8/2021 8/11/2022   Over the past 30 days how many old lesions flared back up? - - 0   Over the past 30 days how many new lesions did you get? - 2 2   Over the past week, how many dressing changes do you do each day? - 2 0   Over the past week, has your wound drainage been: - Mild None   Rate your HS overall from 0 - 10 (0 = no disease, 10 = worst) over the past week:  - 5 5   Rate your pain score from 0 - 10 (0 = no disease, 10 = worst) for the most painful/symptomatic lesion in the  past week:  - 5 - Moderate Pain 8   Over the past week, how much has HS influenced your quality of life? - slightly slightly   Total DLQI Score 8 (moderate effect on patient's life) - -         Labs Reviewed:  - Quantiferon TB Gold Plus lab is WNL (completed 7/26/22)      Physical Exam:  Vitals: There were no vitals taken for this visit.  SKIN: Teledermatology photos were not sent or reviewed.      Medications:  Current Outpatient Medications   Medication     adalimumab (HUMIRA *CF* PEN) 40 MG/0.4ML pen kit     buPROPion (WELLBUTRIN) 100 MG tablet     chlorhexidine (HIBICLENS) 4 % liquid     clindamycin (CLEOCIN) 300 MG capsule     clindamycin (CLINDAMAX) 1 % topical gel     clobetasol (TEMOVATE) 0.05 % external cream     levothyroxine (SYNTHROID/LEVOTHROID) 100 MCG tablet     naltrexone (DEPADE/REVIA) 50 MG tablet     norgestim-eth estrad triphasic (ORTHO TRI-CYCLEN, TRI-SPRINTEC) 0.18/0.215/0.25 MG-35 MCG per tablet     phentermine (ADIPEX-P) 37.5 MG tablet     Current Facility-Administered Medications   Medication     triamcinolone acetonide (KENALOG-10) injection 40 mg      CC Referred Self, MD  No address on file on close of this encounter.

## 2022-08-08 NOTE — PATIENT INSTRUCTIONS
Resorcinol cream twice a day for 30 days for probelmnatic areas and then twice a day as needed for flares.       Recommended vaccines  - Pneumonia vaccine  - Shingles vaccine x2    Chemistry Rx  Resorcinol 15% in vanciream   30 g = $68  60g = $95   Phone #: [phone]        [facility] Dermatology Visit    Thank you for allowing us to participate in your care. Your findings, instructions and follow-up plan are as follows:         When should I call my doctor?  If you are worsening or not improving, please, contact us or seek urgent care as noted below.     Who should I call with questions (adults)?  [facility] (adult and pediatric): [phone]  [facility] (adult): [phone]  For urgent needs outside of business hours call the [facility] at [phone] and ask for the dermatology resident on call  If this is a medical emergency and you are unable to reach an ER, Call [phone]    Who should I call with questions (pediatric)?  [facility]- Pediatric Dermatology  Dr. Karen Campuzano, Dr. Jayla Soliman, Dr. Ellen Friend, [facility]  Dr. Pippa Jaramillo, Dr. Mary Ann Painter & Dr. Ryder Bolden  Non Urgent  Nurse Triage Line; [phone]- Galilea and Kathleen CARRERA Care Coordinators   Karla (/Complex ) [phone]    If you need a prescription refill, please contact your pharmacy. Refills are approved or denied by our physicians during normal business hours, Monday through Fridays  Per office policy, refills will not be granted if you have not been seen within the past year (or sooner depending on your child's condition).    Scheduling Information:  Pediatric Appointment Scheduling and Call Center [phone]  Radiology Scheduling- [phone]  Sedation Unit Scheduling- [phone]  [facility] Scheduling- General [phone]; Pediatric Dermatology [phone]  Main   Services: [phone]  Togolese: [phone]  Sudanese: [phone]  Hmong/English/Chinese: [phone]  Preadmission Nursing Department Fax Number: [phone] (fax all pre-operative paperwork to this number)    For urgent matters arising during evenings, weekends, or holidays that cannot wait for normal business hours please call [phone] and ask for the dermatology resident on call to be paged.

## 2022-08-11 ENCOUNTER — VIRTUAL VISIT (OUTPATIENT)
Dept: DERMATOLOGY | Facility: CLINIC | Age: 37
End: 2022-08-11
Payer: COMMERCIAL

## 2022-08-11 DIAGNOSIS — L73.2 HIDRADENITIS SUPPURATIVA: ICD-10-CM

## 2022-08-11 PROCEDURE — 99214 OFFICE O/P EST MOD 30 MIN: CPT | Mod: 95 | Performed by: DERMATOLOGY

## 2022-08-11 RX ORDER — ADALIMUMAB 40MG/0.4ML
KIT SUBCUTANEOUS
Qty: 6 EACH | Refills: 11 | Status: SHIPPED | OUTPATIENT
Start: 2022-08-11 | End: 2023-03-16

## 2022-08-11 RX ORDER — RESORCINOL
POWDER (GRAM) MISCELLANEOUS
Qty: 30 G | Refills: 11 | Status: SHIPPED | OUTPATIENT
Start: 2022-08-11 | End: 2023-03-16

## 2022-08-11 ASSESSMENT — PAIN SCALES - GENERAL: PAINLEVEL: NO PAIN (0)

## 2022-08-11 NOTE — NURSING NOTE
Dermatology Rooming Note    Sheri S Link's goals for this visit include:   Chief Complaint   Patient presents with     Derm Problem     HS     Lisa Tomas, Visit Facilitator

## 2022-08-11 NOTE — LETTER
8/11/2022       RE: Sheri Pace  [address]  [place] [address]     Dear Colleague,    Thank you for referring your patient, Sheri Pace, to the [facility] DERMATOLOGY CLINIC [place] at [facility]. Please see a copy of my visit note below.    [facility] Dermatology Note  Encounter Date: Aug 11, 2022  Telephone [phone]. Location of teledermatologist: [facility] DERMATOLOGY CLINIC [place].   Start time: 7:20 pm.   End time: 7:27 pm.    Dermatology Problem List:  1.Hidradenitis suppurativa of the bilateral inguinal folds, Scott stage II  - Excision of persistent nodules of right and left medial thighs 6/9/2021  - current tx: Humira (started 4/19/19)  - past tx: dapsone 100 mg daily (suspended on 11/1/2018 due to severe headaches), spironolactone 100mg daily (discontinued due to acne flare), BPO 10% wash, ILK 10 9/12/19, Hibiclens (stopped 2021)  - COVID-19 vaccine and 2 boosters (as of 8/11/22)   - Quantiferon neg 7/26/22  2. Hypothyroidism  -Levothyroxine 100 mcg     ____________________________________________    Assessment & Plan:     # Hidradenitis suppurativa of the bilateral inguinal folds, Scott stage II  - Excision of persistent nodules of right and left medial thighs 6/9/2021   - Impression that excision sites are healing very well overall   - Continue Humira  - Start Resorcinol cream twice a day for 30 days for probelmnatic areas and then twice a day as needed for flares  - Discussed recommended vaccines   - Pneumonia vaccine   - Shingles vaccine x2    Follow-up in 6 months virtually    Staff and Scribe:       Scribe Disclosure  IAme, am serving as a scribe to document services personally performed by Dr. Des Ventura MD, based on data collection and the provider's statements to me.    Provider Disclosure:   The documentation recorded by the scribe accurately reflects the services I  personally performed and the decisions made by me.    Des Ventura MD, FAAD    Departments of Internal Medicine and Dermatology  [facility]  [phone]    ____________________________________________    CC:  Follow-up HS    HPI:  Ms. Sheri Pace is a(n) 36 year old female who presents today as a return patient for hidradenitis suppurativa (HS) follow-up. Last seen in dermatology by Dr. Rincon on 6/16/2021, at which time excision sites were healing well.    Today, Ms. Pace says she is doing well in general. She reports two new flare ups due to lack of access to Humira. She went without Humira for two weeks because of a lapse in renewal. She last injected Humira this past Monday (8/8/22) and feels that her HS is improving. One of the flare ups is inside the left gluteal fold (pain rating 5) and the second area is on the right inguinal fold (pain rating 5). She reports that no other areas are currently bothering her at this time. She is not interested in discussing pain management options at this time due to her current low pain levels.    She is not currently using any bandages and is not experiencing any drainage. She showers once daily. Her current medications include Humira (started 4/19/19), levothyroxine 100 mcg, and norgestim-eth estrad triphasic (BCP) one tablet daily. She is interested in discussing other forms of birth control. Her primary would like her to switch to another form because of the length of time she has taken BCP. She is concerned about potential flare ups and side effects of new birth controls and would like to discuss further. She stopped Hibiclens in 2021.    Patient is otherwise feeling well, without additional skin concerns.    HS Nurse Assessment    Nurse Assessment Data 4/5/2021 4/8/2021 8/11/2022   Over the past 30 days how many old lesions flared back up? - - 0   Over the past 30 days how many new lesions did you get? - 2 2   Over the past week, how  many dressing changes do you do each day? - 2 0   Over the past week, has your wound drainage been: - Mild None   Rate your HS overall from 0 - 10 (0 = no disease, 10 = worst) over the past week:  - 5 5   Rate your pain score from 0 - 10 (0 = no disease, 10 = worst) for the most painful/symptomatic lesion in the past week:  - 5 - Moderate Pain 8   Over the past week, how much has HS influenced your quality of life? - slightly slightly   Total DLQI Score 8 (moderate effect on patient's life) - -         Labs Reviewed:  - Quantiferon TB Gold Plus lab is WNL (completed 7/26/22)      Physical Exam:  Vitals: There were no vitals taken for this visit.  SKIN: Teledermatology photos were not sent or reviewed.      Medications:  Current Outpatient Medications   Medication     adalimumab (HUMIRA *CF* PEN) 40 MG/0.4ML pen kit     buPROPion (WELLBUTRIN) 100 MG tablet     chlorhexidine (HIBICLENS) 4 % liquid     clindamycin (CLEOCIN) 300 MG capsule     clindamycin (CLINDAMAX) 1 % topical gel     clobetasol (TEMOVATE) 0.05 % external cream     levothyroxine (SYNTHROID/LEVOTHROID) 100 MCG tablet     naltrexone (DEPADE/REVIA) 50 MG tablet     norgestim-eth estrad triphasic (ORTHO TRI-CYCLEN, TRI-SPRINTEC) 0.18/0.215/0.25 MG-35 MCG per tablet     phentermine (ADIPEX-P) 37.5 MG tablet     Current Facility-Administered Medications   Medication     triamcinolone acetonide (KENALOG-10) injection 40 mg      CC Referred Self, MD  No address on file on close of this encounter.

## 2022-08-13 ENCOUNTER — PATIENT OUTREACH (OUTPATIENT)
Dept: DERMATOLOGY | Facility: CLINIC | Age: 37
End: 2022-08-13

## 2022-08-13 NOTE — TELEPHONE ENCOUNTER
Attempted to reach patient to schedule follow up in the Dermatology Clinic.  No answer,  LM on VM to call office and [email or web address] message sent..    Schedule with Dr. Des Ventura -2/2023- rtn phone.

## 2022-08-15 ENCOUNTER — TELEPHONE (OUTPATIENT)
Dept: DERMATOLOGY | Facility: CLINIC | Age: 37
End: 2022-08-15

## 2022-08-15 NOTE — TELEPHONE ENCOUNTER
Call patient to help schedule a follow up in Dermatology Clinic per Dr Ventura last visit on 08/11/22 checkout dispositions. Left message with clinic number for patient to call back to schedule.    Schedule Return in about 6 months (around 2/11/2023), or Lexi Polo, for using a phone visit.

## 2022-08-25 ENCOUNTER — MYC MEDICAL ADVICE (OUTPATIENT)
Dept: DERMATOLOGY | Facility: CLINIC | Age: 37
End: 2022-08-25

## 2022-09-18 ENCOUNTER — HEALTH MAINTENANCE LETTER (OUTPATIENT)
Age: 37
End: 2022-09-18

## 2023-03-09 ENCOUNTER — TELEPHONE (OUTPATIENT)
Dept: DERMATOLOGY | Facility: CLINIC | Age: 38
End: 2023-03-09

## 2023-03-09 NOTE — TELEPHONE ENCOUNTER
Left msg letting pt know she will need to reschedule phone appointment as staff has been unable to reach her.

## 2023-03-16 ENCOUNTER — TELEPHONE (OUTPATIENT)
Dept: DERMATOLOGY | Facility: CLINIC | Age: 38
End: 2023-03-16
Payer: COMMERCIAL

## 2023-03-16 DIAGNOSIS — L73.2 HIDRADENITIS SUPPURATIVA: ICD-10-CM

## 2023-03-16 RX ORDER — CLOBETASOL PROPIONATE 0.5 MG/G
CREAM TOPICAL 2 TIMES DAILY
Qty: 45 G | Refills: 0 | Status: SHIPPED | OUTPATIENT
Start: 2023-03-16

## 2023-03-16 RX ORDER — RESORCINOL
POWDER (GRAM) MISCELLANEOUS
Qty: 30 G | Refills: 11 | Status: SHIPPED | OUTPATIENT
Start: 2023-03-16 | End: 2023-08-03

## 2023-03-16 RX ORDER — ADALIMUMAB 40MG/0.4ML
KIT SUBCUTANEOUS
Qty: 6 EACH | Refills: 11 | Status: SHIPPED | OUTPATIENT
Start: 2023-03-16 | End: 2023-08-03

## 2023-03-16 NOTE — TELEPHONE ENCOUNTER
[facility] Call Center    Phone Message    May a detailed message be left on voicemail: yes     Reason for Call: Other: [facility] pharmacy needing to know where on the body Pt Sheri will be applying clobetasol (TEMOVATE) 0.05 % external cream and chlorhexidine (HIBICLENS) 4 % liquid prescribed by Dr. Ventura. Please call Doctor's line [phone]     Action Taken: Message routed to:  [facility] Derm    Travel Screening: Not Applicable

## 2023-07-19 NOTE — PROGRESS NOTES
[facility] Dermatology Note  Encounter Date: Aug 3, 2023  Telephone ([phone] ). Location of teledermatologist: [facility] DERMATOLOGY CLINIC [place]. Start time: 5:51pm. End time: 6:01pm      Dermatology Problem List:  1.Hidradenitis suppurativa of the bilateral inguinal folds, Scott stage II  - current tx: Humira (started 4/19/19), Resorcinol cream 2x daily for flares  - Excision of persistent nodules of right and left medial thighs 6/9/2021  - past tx: dapsone 100 mg daily (suspended on 11/1/2018 due to severe headaches), spironolactone 100mg daily (discontinued due to acne flare), BPO 10% wash, ILK 10 9/12/19, Hibiclens (stopped 2021)  - COVID-19 vaccine and 2 boosters (as of 8/11/22)   - Quantiferon neg 7/26/22  - TB risk assessment 8/3/2023  2. Hypothyroidism  - Levothyroxine 100 mcg  SH:  recently passed away 3/2023 unexpectedly in accident     ____________________________________________     Assessment & Plan:      # Hidradenitis suppurativa of the bilateral inguinal folds, Scott stage II, stable  - Excision of persistent nodules of right and left medial thighs 6/9/202, excision sites have healed well   - Continue Humira. Will ensure patient has refills for the next year.  - Continue Resorcinol cream 2x daily as needed for flares  - Discussed recommended vaccine               - Pneumonia vaccine              - Shingles vaccine x2    Follow-up:  7 months via phone    Staff and Scribe:   I, Yaima Murillo, am serving as a scribe to document services personally performed by Des Ventura MD based on data collection and the provider's statements to me.     Provider Disclosure:   The documentation recorded by the scribe accurately reflects the services I personally performed and the decisions made by me.    Des Ventura MD, FAAD    Departments of Internal Medicine and Dermatology  [facility]  [place]  [phone]    ____________________________________________    CC:   Chief Complaint   Patient presents with    Derm Problem     Sheri is following up for HS.     HPI:   Ms. Sheri HARRIS Link is a(n) 37 year old female who presents today as a return patient for HS. Last seen August 11, 2022 by myself. At that time, plan was the continue Humira and start resorcinol cream.    Today, patient reports that HS has been well-controlled over the past year. Patient reports minimal flaring in the past month, with a small flare of a few new lesions on the upper left groin and left inner thigh. Patient states she has continued Humira and applies resorcinol cream to active lesions as needed, but has not needed to use the cream very often recently. Patient states she is happy with her current treatment plan, and would like to make sure she has medication refills for the next year.    Patient is otherwise feeling well, without additional skin concerns.    HS Nurse Assessment        4/8/2021     3:12 PM 8/11/2022     4:10 PM 8/3/2023     2:10 PM   Nurse Assessment Data   Over the past 30 days how many old lesions flared back up?  0 2   Over the past 30 days how many new lesions did you get? 2 2 2   Over the past week, how many dressing changes do you do each day? 2 0 0   Over the past week, has your wound drainage been: Mild None Mild   Rate your HS overall from 0 - 10 (0 = no disease, 10 = worst) over the past week:  5 5 4   Rate your pain score from 0 - 10 (0 = no disease, 10 = worst) for the most painful/symptomatic lesion in the past week:  5 - Moderate Pain 8 4   Over the past week, how much has HS influenced your quality of life? slightly slightly slightly     - last negative TB test 7/26/22    Medications:  Current Outpatient Medications   Medication    adalimumab (HUMIRA *CF* PEN) 40 MG/0.4ML pen kit    buPROPion (WELLBUTRIN) 100 MG tablet    chlorhexidine (HIBICLENS) 4 % liquid    clindamycin (CLEOCIN) 300 MG  capsule    clindamycin (CLINDAMAX) 1 % topical gel    clobetasol (TEMOVATE) 0.05 % external cream    levothyroxine (SYNTHROID/LEVOTHROID) 100 MCG tablet    naltrexone (DEPADE/REVIA) 50 MG tablet    norgestim-eth estrad triphasic (ORTHO TRI-CYCLEN, TRI-SPRINTEC) 0.18/0.215/0.25 MG-35 MCG per tablet    phentermine (ADIPEX-P) 37.5 MG tablet    Resorcinol POWD     Current Facility-Administered Medications   Medication    triamcinolone acetonide (KENALOG-10) injection 40 mg      Past Medical History:   There is no problem list on file for this patient.    No past medical history on file.     CC No referring provider defined for this encounter. on close of this encounter.

## 2023-07-30 ENCOUNTER — HEALTH MAINTENANCE LETTER (OUTPATIENT)
Age: 38
End: 2023-07-30

## 2023-08-03 ENCOUNTER — VIRTUAL VISIT (OUTPATIENT)
Dept: DERMATOLOGY | Facility: CLINIC | Age: 38
End: 2023-08-03
Payer: COMMERCIAL

## 2023-08-03 DIAGNOSIS — L73.2 HIDRADENITIS SUPPURATIVA: ICD-10-CM

## 2023-08-03 DIAGNOSIS — D84.9 IMMUNOSUPPRESSION (H): Primary | ICD-10-CM

## 2023-08-03 PROCEDURE — 99214 OFFICE O/P EST MOD 30 MIN: CPT | Mod: 95 | Performed by: DERMATOLOGY

## 2023-08-03 RX ORDER — TIRZEPATIDE 2.5 MG/.5ML
INJECTION, SOLUTION SUBCUTANEOUS
COMMUNITY
Start: 2022-10-18 | End: 2023-11-03 | Stop reason: DRUGHIGH

## 2023-08-03 RX ORDER — RESORCINOL
POWDER (GRAM) MISCELLANEOUS
Qty: 30 G | Refills: 11 | Status: SHIPPED | OUTPATIENT
Start: 2023-08-03 | End: 2024-04-04

## 2023-08-03 RX ORDER — ADALIMUMAB 40MG/0.4ML
KIT SUBCUTANEOUS
Qty: 6 EACH | Refills: 11 | Status: SHIPPED | OUTPATIENT
Start: 2023-08-03 | End: 2023-11-03

## 2023-08-03 NOTE — LETTER
8/3/2023       RE: Sheri Pace  [address]  [place] [address]     Dear Colleague,    Thank you for referring your patient, Sheri Pace, to the [facility] DERMATOLOGY CLINIC [place] at [facility]. Please see a copy of my visit note below.    [facility] Dermatology Note  Encounter Date: Aug 3, 2023  Telephone ([phone] ). Location of teledermatologist: [facility] DERMATOLOGY CLINIC [place]. Start time: 5:51pm. End time: 6:01pm      Dermatology Problem List:  1.Hidradenitis suppurativa of the bilateral inguinal folds, Scott stage II  - current tx: Humira (started 4/19/19), Resorcinol cream 2x daily for flares  - Excision of persistent nodules of right and left medial thighs 6/9/2021  - past tx: dapsone 100 mg daily (suspended on 11/1/2018 due to severe headaches), spironolactone 100mg daily (discontinued due to acne flare), BPO 10% wash, ILK 10 9/12/19, Hibiclens (stopped 2021)  - COVID-19 vaccine and 2 boosters (as of 8/11/22)   - Quantiferon neg 7/26/22  - TB risk assessment 8/3/2023  2. Hypothyroidism  - Levothyroxine 100 mcg  SH:  recently passed away 3/2023 unexpectedly in accident     ____________________________________________     Assessment & Plan:      # Hidradenitis suppurativa of the bilateral inguinal folds, Scott stage II, stable  - Excision of persistent nodules of right and left medial thighs 6/9/202, excision sites have healed well   - Continue Humira. Will ensure patient has refills for the next year.  - Continue Resorcinol cream 2x daily as needed for flares  - Discussed recommended vaccine               - Pneumonia vaccine              - Shingles vaccine x2    Follow-up:  7 months via phone    Staff and Scribe:   I, Yaima Murillo, am serving as a scribe to document services personally performed by Des Ventura MD based on data collection and the provider's statements to me.     Provider  Disclosure:   The documentation recorded by the scribe accurately reflects the services I personally performed and the decisions made by me.    Des Ventura MD, FAAD    Departments of Internal Medicine and Dermatology  [facility]  [phone]    ____________________________________________    CC:   Chief Complaint   Patient presents with    Derm Alyssa Wade is following up for HS.     HPI:   Ms. Sheri HARRIS Link is a(n) 37 year old female who presents today as a return patient for HS. Last seen August 11, 2022 by myself. At that time, plan was the continue Humira and start resorcinol cream.    Today, patient reports that HS has been well-controlled over the past year. Patient reports minimal flaring in the past month, with a small flare of a few new lesions on the upper left groin and left inner thigh. Patient states she has continued Humira and applies resorcinol cream to active lesions as needed, but has not needed to use the cream very often recently. Patient states she is happy with her current treatment plan, and would like to make sure she has medication refills for the next year.    Patient is otherwise feeling well, without additional skin concerns.    HS Nurse Assessment        4/8/2021     3:12 PM 8/11/2022     4:10 PM 8/3/2023     2:10 PM   Nurse Assessment Data   Over the past 30 days how many old lesions flared back up?  0 2   Over the past 30 days how many new lesions did you get? 2 2 2   Over the past week, how many dressing changes do you do each day? 2 0 0   Over the past week, has your wound drainage been: Mild None Mild   Rate your HS overall from 0 - 10 (0 = no disease, 10 = worst) over the past week:  5 5 4   Rate your pain score from 0 - 10 (0 = no disease, 10 = worst) for the most painful/symptomatic lesion in the past week:  5 - Moderate Pain 8 4   Over the past week, how much has HS influenced your quality of life? slightly slightly slightly     -  last negative TB test 7/26/22    Medications:  Current Outpatient Medications   Medication    adalimumab (HUMIRA *CF* PEN) 40 MG/0.4ML pen kit    buPROPion (WELLBUTRIN) 100 MG tablet    chlorhexidine (HIBICLENS) 4 % liquid    clindamycin (CLEOCIN) 300 MG capsule    clindamycin (CLINDAMAX) 1 % topical gel    clobetasol (TEMOVATE) 0.05 % external cream    levothyroxine (SYNTHROID/LEVOTHROID) 100 MCG tablet    naltrexone (DEPADE/REVIA) 50 MG tablet    norgestim-eth estrad triphasic (ORTHO TRI-CYCLEN, TRI-SPRINTEC) 0.18/0.215/0.25 MG-35 MCG per tablet    phentermine (ADIPEX-P) 37.5 MG tablet    Resorcinol POWD     Current Facility-Administered Medications   Medication    triamcinolone acetonide (KENALOG-10) injection 40 mg      Past Medical History:   There is no problem list on file for this patient.    No past medical history on file.     CC No referring provider defined for this encounter. on close of this encounter.

## 2023-08-03 NOTE — NURSING NOTE
Dermatology Rooming Note    Sheri S Link's goals for this visit include:   Chief Complaint   Patient presents with    Derm Problem     Sheri is following up for HS.

## 2023-09-28 ENCOUNTER — TELEPHONE (OUTPATIENT)
Dept: DERMATOLOGY | Facility: CLINIC | Age: 38
End: 2023-09-28
Payer: COMMERCIAL

## 2023-09-28 NOTE — TELEPHONE ENCOUNTER
PA Needed / Plan Limitations Exceeded    Medication: Humira CF 40mg/0.4ml pen  QTY/DS: 4 per 28 days  NEW INS: No  Insurance Company:  [employer]  Pharmacy Filling the Rx:  [facility] :    Date of last fill: 2023

## 2023-09-28 NOTE — TELEPHONE ENCOUNTER
PA Initiation    Medication: HUMIRA *CF* PEN 40 MG/0.4ML SC PNKT  Insurance Company: [employer] [facility] - Phone [phone] Fax [phone]  Pharmacy Filling the Rx: [facility] - [place], [place] - [address]  Filling Pharmacy Phone:    Filling Pharmacy Fax:    Start Date: 9/28/2023    Started on [employer] [place].

## 2023-09-28 NOTE — LETTER
March 15, 2024      Sheri S Link  [address]  [place] [address]              Dear Sheri,          Sincerely,      Des Ventura {PROVIDER CREDENTIALS:[id number]}

## 2023-10-25 ENCOUNTER — MYC MEDICAL ADVICE (OUTPATIENT)
Dept: DERMATOLOGY | Facility: CLINIC | Age: 38
End: 2023-10-25
Payer: COMMERCIAL

## 2023-10-31 ENCOUNTER — TELEPHONE (OUTPATIENT)
Dept: DERMATOLOGY | Facility: CLINIC | Age: 38
End: 2023-10-31
Payer: COMMERCIAL

## 2023-10-31 DIAGNOSIS — L73.2 HIDRADENITIS SUPPURATIVA: Primary | ICD-10-CM

## 2023-10-31 NOTE — PROGRESS NOTES
Medication Therapy Management (MTM) Encounter    ASSESSMENT:                            Medication Adherence/Access: See below for considerations    Hidradenitis suppurativa:   Well controlled on Humira 40 mg weekly. Insurance plan turnover anticipated on Dec 1, 2023. Patient preference to try to continue Humira vs preferred biosimilars if able. Discussed using savings card to cover copay if this increases & we will need to ensure these savings will extend the entire year. Alternatively, may consider switching to preferred biosimilar if cost/coverage barriers. Encouraged receiving the following non-live vaccines: Prevnar 20, Covid-19 2023-24 vaccine, annual influenza, Shingrix vaccine.     Hypothyroidism:   Stable. Last TSH is within normal limits.      Mood:   Unchanged, follows closely with primary care provider.     Weight Management:   Weight loss progressing with Mounjaro.Patient has lost >20% body weight from baseline. Due to coverage barriers with off-label use of Mounjaro she has extended the frequency she takes this, however is still recognizing benefits with limited to no side effects. We discussed option to consider switching to Wegovy if insurance will cover weight loss medications. She will discuss this with her primary care provider & insurance. May consider switching from Mounjaro 7.5 mg to Wegovy 2 mg or 2.4 mg weekly.     PLAN:                            Continue Humira 40 mg weekly. Sent refills to [facility]  Will initiate a new prior authorization request/check on Dec 1st 2023.   Need to determine if Humira savings card will sufficiently cover higher copays in 2024.   Alternatively: may switch to preferred biosimilar   Vaccine recommendations: Prevnar 20 (for prevention of pneumonia), Covid-19 vaccine (2023-24 version), annual influenza, Shingrix vaccine (for shingles prevention)   Consider switching from Mounjaro to Wegovy. Patient to discuss with primary provider & insurance  plan if Wegovy (semaglutide) may be covered or excluded by pharmacy benefits.      Follow-up: with me (Alicia Willett, PharmD) by [employer] message when plan turns over on 12/1/23, then follow-up for an MTM visit in around 6 months (5/3/24).     SUBJECTIVE/OBJECTIVE:                          Sheri Pace is a 37 year old female called for an initial visit. She was referred to me from Dr. Des Ventura MD.      Reason for visit: review Humira continuation with upcoming change in insurance.    Medication Adherence/Access:   Humira coverage:   - Currently covered by insurance   - Received letter from insurance that Humira will no longer be a preferred brand medication; should still be covered with PA however copay cost will likely increase  - Plan turns over on Dec 1st 2023   - Insurance preferred alternatives: generic adalimumab-adaz (Hyrimoz) or adalimumab-fkjp (Hulio) or brand Hadlima (adalimumab-bwwd)     Hidradenitis suppurativa:   - Humira (adalimumab) 40 mg weekly (started 4/2019 ; this has been effective)   - Resorcinol 15% cream twice daily as needed for flares (fills through ChemRx)   - clobetasol 0.05% cream as needed (rarely uses for injection site irritation with Humira)   - Oral contraceptive (norgestim-eth estrad triphasic) daily (monthly menstrual cycles)   Side effects: None.    Symptoms: Reports HS has been overall well controlled on Humira. Will typically get a small flare with a couple of lesions each month, reports this is manageable. Her preference would be to continue on Humira vs switch to biosimilar if insurance allows & it continues to be affordable.   Tyler Stage II    Specialist: Dr. Des Ventura MD, Dermatology. Last visit on 8/7/23. The following was recommended:   - Continue Humira and resorcinol cream as needed     Previous treatments:   - dapsone 100 mg daily (suspended on 11/1/2018 due to severe headaches)  - spironolactone 100mg daily (discontinued due to acne flare)  - Topical  therapies: BPO 10% wash, Hibiclens (stopped 2021)   - ILK injection (9/12/19)  - Excision of persistent nodules of right and left medial thighs 6/9/2021     Pre-Biologic Screening:   Hep B Surface Antibody Positive (3/16/19), indicates immunity from HBV    Hep B Core Antibody  Non-reactive (3/16/19)    Hep B Surface Antigen Non-reactive (3/16/19)    Hep C Antibody  Non-reactive (3/16/19)    HIV Antigen Antibody Non-reactive (3/16/19)    Quantiferon TB Gold Negative (7/26/22). No risk factors for TB infection, does not need repeat screening at this time.      CBC (4/18/23): WNL  CMP (4/18/23): eGFR >90 mL/min, liver panel WNL    Immunization History   Covid-19 vaccine (7719-7687 version)  Completed original Moderna series. Consider 2023-24 version.    Influenza (annual, 9146-8199) Due for annual , avoid live FluMist   Pneumococcal Never received. Consider Prevnar 20.   Tetanus/Tdap  Up-to-date. Due for booster 10/2024.    Shingrix Never received. Consider 2-dose Shingrix series.     Hypothyroidism:   - Levothyroxine 112 mcg daily  Side effects: None.  No symptoms reported.     TSH (4/18/23): 1.59     Mood:  - Escitalopram 20 mg once daily (started Mar 2023)   Side effects: None.   No concerns with changes in mood reported today. Treatment is managed by her primary care provider.     Weight Management:   - Mounjaro 7.5 mg weekly (see notes below)   Side effects: minimal at this time. Reports having some nausea & GI discomfort initially when starting this, this improved over time & by staying well hydrated.     Patient reports Mounjaro has been going well for her. She has lost over 50 lbs since starting this. Notes that her insurance is not covering this due to off-label use. Used savings card which covered first 6 months (~$25/month), then this savings card ran out so she found another savings card which has helped lower the cost (~$400/month). Due to higher cost she has extended out her weekly injections - typically  "gives every 3 weeks & finds this is effective without causing side effects.     Patient reports she will run out of Mounjaro by the end of the year & she is not sure if she will be able to get this covered with savings card in 2024. Has considered switching to Wegovy if her insurance will cover this.     Medication History:  - Phentermine (not helpful)  - naltrexone      Wt (8/7/23): 183 lb (83 kg)   Ht (4/18/23): 5' 5.25\" (165.7 cm)   BMI (8/7/23): 30.2  kg/m2  Initial weight (10/17/22): 226 lb     Allergies/ADRs: Reviewed in chart  Past Medical History: Reviewed in chart  Tobacco: She reports that she has never smoked. She has never used smokeless tobacco.  ----------------    I spent 26 minutes with this patient today. All changes were made via collaborative practice agreement with Des Ventura. A copy of the visit note was provided to the patient's provider(s).    A summary of these recommendations was sent via [employer].    Alicia Willett, PharmD  Medication Therapy Management Pharmacist   [facility]    Telemedicine Visit Details  Type of service:  Telephone visit  Start Time:  10:01am  End Time:  10:27am       Medication Therapy Recommendations  Hidradenitis suppurativa    Rationale: Preventive therapy - Needs additional medication therapy - Indication   Recommendation: Order Vaccine - Prevnar 20 0.5 ML Lizzy - Consider receiving Prevnar 20, Covid-19 2023-24 vaccine, annual influenza, Shingrix vaccine.   Status: Patient Agreed - Adherence/Education            "

## 2023-11-03 ENCOUNTER — VIRTUAL VISIT (OUTPATIENT)
Dept: RHEUMATOLOGY | Facility: CLINIC | Age: 38
End: 2023-11-03
Attending: DERMATOLOGY
Payer: COMMERCIAL

## 2023-11-03 DIAGNOSIS — F41.9 ANXIETY: ICD-10-CM

## 2023-11-03 DIAGNOSIS — E66.811 CLASS 1 OBESITY WITH SERIOUS COMORBIDITY AND BODY MASS INDEX (BMI) OF 30.0 TO 30.9 IN ADULT, UNSPECIFIED OBESITY TYPE: ICD-10-CM

## 2023-11-03 DIAGNOSIS — L73.2 HIDRADENITIS SUPPURATIVA: Primary | ICD-10-CM

## 2023-11-03 DIAGNOSIS — E03.9 HYPOTHYROIDISM, UNSPECIFIED TYPE: ICD-10-CM

## 2023-11-03 RX ORDER — ESCITALOPRAM OXALATE 20 MG/1
20 TABLET ORAL DAILY
COMMUNITY

## 2023-11-03 RX ORDER — LEVOTHYROXINE SODIUM 112 UG/1
112 TABLET ORAL DAILY
COMMUNITY
Start: 2023-08-25

## 2023-11-03 RX ORDER — ADALIMUMAB 40MG/0.4ML
40 KIT SUBCUTANEOUS
Qty: 1.6 ML | Refills: 6 | Status: SHIPPED | OUTPATIENT
Start: 2023-11-03 | End: 2024-03-20

## 2023-11-03 RX ORDER — TIRZEPATIDE 7.5 MG/.5ML
7.5 INJECTION, SOLUTION SUBCUTANEOUS WEEKLY
COMMUNITY
Start: 2023-10-30

## 2023-11-03 NOTE — Clinical Note
11/3/2023       RE: Sheri Pace  [address]  [place] [address]     Dear Colleague,    Thank you for referring your patient, Sheri Pace, to the [facility]. Please see a copy of my visit note below.    Medication Therapy Management (MTM) Encounter    ASSESSMENT:                            Medication Adherence/Access: {adherencechoices:829759}    Hidradenitis suppurativa:   ***  Encouraged receiving the following non-live vaccines: Prevnar 20, Covid-19 2023-24 vaccine, annual influenza, Shingrix vaccine.     Hypothyroidism:   Stable. Last TSH is within normal limits.      Mood:   ***    Weight Management:   ***    PLAN:                            Order sent for *** to [facility] (#[phone]).   ***    Vaccine recommendations: Prevnar 20 (for prevention of pneumonia), Covid-19 vaccine (2023-24 version), annual influenza, Shingrix vaccine (for shingles prevention)     Immunization History   Covid-19 vaccine (2931-9637 version)  Completed original Moderna series. Consider 2023-24 version.    Influenza (annual, 7110-5804) Due for annual, avoid live FluMist   Pneumococcal Never received. Consider Prevnar 20.   Tetanus/Tdap  Up-to-date. Due for booster 10/2024.    Shingrix Never received. Consider 2-dose Shingrix series.       Follow-up: {followuptest2:951636}    SUBJECTIVE/OBJECTIVE:                          Sheri Pace is a 37 year old female called for an initial visit. She was referred to me from Dr. Des Ventura MD. {mtmvisitdetails:456121}     Reason for visit: review Humira continuation with upcoming change in insurance.    Medication Adherence/Access:   Humira coverage:   - Currently covered by insurance   - Received letter from insurance that Humira will no longer be a preferred brand medication  - [email or web address] ***   - Pharmacy benefits: [employer] ([facility] Commercial  plan  - Plan turns over on Dec 1st 2023 - ***    List I found for 2024  - Tier 6 - Humira, Hulio, Amjevita, Hyrimoz, Idacio, Yuflyma, Yusimry, Cyltezo,   - Tier 5 - Hadlima  - Tier 5 - Hyrimoz and Hulio (generics)     Generic Hulio: Adalimumab-adbm 40 mg/0.8 mL(auto-injector kit) - also comes in prefilled syringes  Generic Hyrimoz: Adalimumab-adaz 40 mg/0.4 mL (auto-injector) also prefilled syringe   Hadlima PushTouch: Adalimumab-bwwd 40 mg/0.4 mL      Hidradenitis suppurativa:   - Humira (adalimumab) 40 mg weekly (started 4/2019): ***  - Resorcinol 15% cream twice daily as needed for flares: ***  - clobetasol 0.05% cream as needed: ***  - chlorhexidine (Hibiclens) wash as needed: ***   - Oral contraceptive: *** daily. Last menses ***.   Side effects: ***.    Symptoms: ***.  Scott Stage II    Specialist: {Dermatology Providers:735455}, Dermatology. Last visit on ***. The following was recommended:   - ***    Previous treatments:   - dapsone 100 mg daily (suspended on 11/1/2018 due to severe headaches)  - spironolactone 100mg daily (discontinued due to acne flare)  - Topical therapies: BPO 10% wash, Hibiclens (stopped 2021)   - ILK injection (9/12/19)  - Excision of persistent nodules of right and left medial thighs 6/9/2021     Pre-Biologic Screening:   Hep B Surface Antibody Positive (3/16/19), indicates immunity from HBV    Hep B Core Antibody  Non-reactive (3/16/19)    Hep B Surface Antigen Non-reactive (3/16/19)    Hep C Antibody  Non-reactive (3/16/19)    HIV Antigen Antibody Non-reactive (3/16/19)    Quantiferon TB Gold Negative (7/26/22). No risk factors for TB infection, does not need repeat screening at this time.      CBC (4/18/23): WNL  CMP (4/18/23): eGFR >90 mL/min, liver panel WNL    All patients on biologics should avoid live vaccines (varicella/VZV, intranasal influenza, MMR, or yellow fever vaccine (if traveling))      Immunization History   Covid-19 vaccine (6345-0844 version)  Completed original  "Moderna series. Consider 2023-24 version.    Influenza (annual, 4951-7508) Due for annual , avoid live FluMist   Pneumococcal Never received. Consider Prevnar 20.   Tetanus/Tdap  Up-to-date. Due for booster 10/2024.    Shingrix Never received. Consider 2-dose Shingrix series.     Hypothyroidism:   - Levothyroxine 112 mcg daily  Side effects: None.  Patient is having the following symptoms: {hypo/hyperthyroid:160774}.     TSH (4/18/23): 1.59     Mood:  - Escitalopram 20 mg once daily (started Mar 2023)   Side effects: ***.     Current symptoms include: ***.  Patient reports symptoms are { :527885}.  Patient {IS/IS NOT:9024} seeing a therapist.   Patient {IS/IS NOT:9024} seeing a psychiatrist.    Per outside PCP progress note from 5/17/23:   While she is not feeling much different her PHQ9 did reduce slightly and KENIA went from 10 to now 3. We are going to continue Lexapro 20 mg daily. Referral placed to start individual therapy at [facility] in [place].     Weight Management:   - Mounjaro 7.5 mg weekly: ***   Side effects: ***.     Nutrition/Eating Habits: ***.    Exercise/Activity: ***.   Medication History:  - Phentermine (not helpful)  - naltrexone ***     Wt (4/18/23): 192 lb (87.1 kg)   Ht (4/18/23): 5' 5.25\" (165.7 cm)   BMI (4/18/23): 31.71 kg/m2    Specialist: follows with PCP (Karo Marshall) for weight management. Was started on Mounjaro around Oct 2022 and has slowly titrated dose. Last progress note from 2/28/23. The following was reported: She was 226 lbs at last visit and currently today now 200 lbs. She was on vacation recently so did skip taking for that week. She denies any negative side effects. Nausea and loose stools initially but now resolved. No abdominal pain. She has been working hard on making healthy choices. She is doing some intermittent fasting. Staying active.    Allergies/ADRs: {1/2/3/4/5:180922}  Past Medical History: {1/2/3/4/5:694608}  Tobacco: She reports that she has never smoked. " "She has never used smokeless tobacco.  Alcohol: {ALCOHOL CONSUMPTION HX:095109}  {Social and Goals:285714}    ----------------  {JENNA?:020753}    I spent {mtm total time 3:715831} with this patient today. { :419240}. A copy of the visit note was provided to the patient's provider(s).    A summary of these recommendations {GIVEN/NOT GIVEN:219744}.    ***    Telemedicine Visit Details  Type of service:  {telemedvisitmtm:714034::\"Telephone visit\"}  Start Time: {video/phone visit start time:152948}  End Time: {video/phone visit end time:152948}       Medication Therapy Recommendations  No medication therapy recommendations to display     Medication Therapy Management (MTM) Encounter    ASSESSMENT:                            Medication Adherence/Access: See below for considerations    Hidradenitis suppurativa:   Well controlled on Humira 40 mg weekly. Insurance plan turnover anticipated on Dec 1, 2023. Patient preference to try to continue Humira vs preferred biosimilars if able. Discussed using savings card to cover copay if this increases & we will need to ensure these savings will extend the entire year. Alternatively, may consider switching to preferred biosimilar (***) if cost/coverage barriers. Encouraged receiving the following non-live vaccines: Prevnar 20, Covid-19 2023-24 vaccine, annual influenza, Shingrix vaccine.     Hypothyroidism:   Stable. Last TSH is within normal limits.      Depression/Anxiety:   Unchanged, follows closely with primary care provider.     Weight Management:   Weight loss progressing with Mounjaro.Patient has lost >20% body weight from baseline. Due to coverage barriers with off-label use of Mounjaro she has extended the frequency she takes this, however is still recognizing benefits with limited to no side effects. We discussed option to consider switching to Wegovy if insurance will cover weight loss medications. She will discuss this with her primary care provider & insurance. May " consider switching from Mounjaro 7.5 mg to Wegovy 2 mg or 2.4 mg weekly.     PLAN:                            Continue Humira 40 mg weekly. Sent refills to [facility]  Will initiate a new prior authorization request/check on Dec 1st 2023.   Need to determine if Humira savings card will sufficiently cover higher copays in 2024.   Alternatively: may switch to preferred biosimilar   Vaccine recommendations: Prevnar 20 (for prevention of pneumonia), Covid-19 vaccine (2023-24 version), annual influenza, Shingrix vaccine (for shingles prevention)   Consider switching from Mounjaro to Wegovy. Patient to discuss with primary provider & insurance plan if Wegovy (semaglutide) may be covered or excluded by pharmacy benefits.      Follow-up: with me (Alicia Willett, PharmD) by [employer] message when plan turns over on 12/1/23, then follow-up for an MTM visit in around 6 months (5/3/24).     SUBJECTIVE/OBJECTIVE:                          Sheri Pace is a 37 year old female called for an initial visit. She was referred to me from Dr. Des Ventura MD.      Reason for visit: review Humira continuation with upcoming change in insurance.    Medication Adherence/Access:   Humira coverage:   - Currently covered by insurance   - Received letter from insurance that Humira will no longer be a preferred brand medication; should still be covered with PA however copay cost will likely increase  - Plan turns over on Dec 1st 2023   - Insurance preferred alternatives: generic adalimumab-adaz (Hyrimoz) or adalimumab-fkjp (Hulio) or brand Hadlima (adalimumab-bwwd)     Hidradenitis suppurativa:   - Humira (adalimumab) 40 mg weekly (started 4/2019 ; this has been effective)   - Resorcinol 15% cream twice daily as needed for flares (fills through [employer]Rx)   - clobetasol 0.05% cream as needed (rarely uses for injection site irritation with Humira)   - Oral contraceptive (norgestim-eth estrad triphasic) daily (monthly menstrual  cycles)   Side effects: None.    Symptoms: Reports HS has been overall well controlled on Humira. Will typically get a small flare with a couple of lesions each month, reports this is manageable. Her preference would be to continue on Humira vs switch to biosimilar if insurance allows & it continues to be affordable.   Tyler Stage II    Specialist: Dr. Des Ventura MD, Dermatology. Last visit on 8/7/23. The following was recommended:   - Continue Humira and resorcinol cream as needed     Previous treatments:   - dapsone 100 mg daily (suspended on 11/1/2018 due to severe headaches)  - spironolactone 100mg daily (discontinued due to acne flare)  - Topical therapies: BPO 10% wash, Hibiclens (stopped 2021)   - ILK injection (9/12/19)  - Excision of persistent nodules of right and left medial thighs 6/9/2021     Pre-Biologic Screening:   Hep B Surface Antibody Positive (3/16/19), indicates immunity from HBV    Hep B Core Antibody  Non-reactive (3/16/19)    Hep B Surface Antigen Non-reactive (3/16/19)    Hep C Antibody  Non-reactive (3/16/19)    HIV Antigen Antibody Non-reactive (3/16/19)    Quantiferon TB Gold Negative (7/26/22). No risk factors for TB infection, does not need repeat screening at this time.      CBC (4/18/23): WNL  CMP (4/18/23): eGFR >90 mL/min, liver panel WNL    Immunization History   Covid-19 vaccine (0154-8986 version)  Completed original Moderna series. Consider 2023-24 version.    Influenza (annual, 5088-0074) Due for annual , avoid live FluMist   Pneumococcal Never received. Consider Prevnar 20.   Tetanus/Tdap  Up-to-date. Due for booster 10/2024.    Shingrix Never received. Consider 2-dose Shingrix series.     Hypothyroidism:   - Levothyroxine 112 mcg daily  Side effects: None.  No symptoms reported.     TSH (4/18/23): 1.59     Depression/Anxiety:  - Escitalopram 20 mg once daily (started Mar 2023)   Side effects: None.   No concerns with changes in mood reported today. Treatment is managed  "by her primary care provider.     Weight Management:   - Mounjaro 7.5 mg weekly (see notes below)   Side effects: minimal at this time. Reports having some nausea & GI discomfort initially when starting this, this improved over time & by staying well hydrated.     Patient reports Mounjaro has been going well for her. She has lost over 50 lbs since starting this. Notes that her insurance is not covering this due to off-label use. Used savings card which covered first 6 months (~$25/month), then this savings card ran out so she found another savings card which has helped lower the cost (~$400/month). Due to higher cost she has extended out her weekly injections - typically gives every 3 weeks & finds this is effective without causing side effects.     Patient reports she will run out of Mounjaro by the end of the year & she is not sure if she will be able to get this covered with savings card in 2024. Has considered switching to Wegovy if her insurance will cover this.     Medication History:  - Phentermine (not helpful)  - naltrexone      Wt (8/7/23): 183 lb (83 kg)   Ht (4/18/23): 5' 5.25\" (165.7 cm)   BMI (8/7/23): 30.2  kg/m2  Initial weight (10/17/22): 226 lb     Allergies/ADRs: Reviewed in chart  Past Medical History: Reviewed in chart  Tobacco: She reports that she has never smoked. She has never used smokeless tobacco.  ----------------    I spent 26 minutes with this patient today. All changes were made via collaborative practice agreement with Des Ventura. A copy of the visit note was provided to the patient's provider(s).    A summary of these recommendations was sent via [employer].    Alicia Willett, PharmD  Medication Therapy Management Pharmacist   [facility] Dermatology    Telemedicine Visit Details  Type of service:  Telephone visit  Start Time:  10:01am  End Time:  10:27am       Medication Therapy Recommendations  No medication therapy recommendations to display       Again, thank you " for allowing me to participate in the care of your patient.      Sincerely,    Alicia Willett RPH

## 2023-11-03 NOTE — Clinical Note
Routing as an FYI. I'll check in on her insurance when it changes over on 12/1/23 to make sure Humira is still affordable (will not longer be a preferred brand name) Alicia

## 2023-11-03 NOTE — PATIENT INSTRUCTIONS
"Recommendations from today's MTM visit:                                                       Continue Humira 40 mg weekly. I sent refills of this to [facility]    I will initiate a new prior authorization request on Dec 1st 2023 to check cost/coverage   Will determine if Humira savings card will sufficiently cover higher copays through all of 2024.   Alternatively: we may consider switching Humira to preferred biosimilar if cost requires     Vaccine recommendations: Prevnar 20 (for prevention of pneumonia), Covid-19 vaccine (2023-24 version), annual influenza, Shingrix vaccine (for shingles prevention)     May consider switching from Mounjaro to Wegovy. Discuss this with your primary provider & check if Wegovy (semaglutide) may be covered or excluded by pharmacy benefits. If Wegovy or other weight loss medications are excluded from your insurance benefits - you can contact them to see if they offer any other plans that would include Wegovy.     Follow-up: with me (Alicia Willett, PharmD) by [employer] message when plan turns over on 12/1/23, then follow-up for an MTM visit in around 6 months (5/3/24).     It was great speaking with you today.  I value your experience and would be very thankful for your time in providing feedback in our clinic survey. In the next few days, you may receive an email or text message from [employer] with a link to a survey related to your  clinical pharmacist.\"     To schedule another MTM appointment, please call the clinic directly or you may call the MTM scheduling line at [phone] or toll-free at [phone].     My Clinical Pharmacist's contact information:                                                      Please feel free to contact me with any questions or concerns you have.      Alicia Willett, PharmD  Medication Therapy Management Pharmacist   [facility]     "

## 2023-12-12 ENCOUNTER — MYC MEDICAL ADVICE (OUTPATIENT)
Dept: RHEUMATOLOGY | Facility: CLINIC | Age: 38
End: 2023-12-12
Payer: COMMERCIAL

## 2023-12-18 NOTE — TELEPHONE ENCOUNTER
Spoke with Isaura Sullivan - insurance covers Humira however copay is now around $10,000 per month and anticipate copay card would run out quickly. Sent [employer] message to patient with options moving forward. May switch to one of the insurance plans preferred biosimilars - Priscilao.     Alicia Willett [facility]

## 2023-12-27 NOTE — TELEPHONE ENCOUNTER
Sent another [employer] message to follow up on this today (12/27/23). Awaiting response with patient to preference to proceed with Humira or switch to plans preferred biosimilars.     Alicia Willett RPh

## 2024-01-04 NOTE — TELEPHONE ENCOUNTER
See [employer] message from patient 1/4/24. She completed the online application for [facility]'s patient assistance program. Routing to Isaura Sullivan as an FYI.     Alicia Willett RP

## 2024-01-16 NOTE — TELEPHONE ENCOUNTER
Called [employer], they have patient portion, no provider form. Messaged MTM to see best way to get signed.   [facility] AMBULATORY ENCOUNTER  GI NEW PATIENT NOTE    REQUESTING PROVIDER:  Kal Dominique MD    CHIEF COMPLAINT:  No chief complaint on file.       SUBJECTIVE:    Radha Rowe is a 76 year old female seen today at the request of Kal Dominique MD for ulcerative colitis.  Patient has a longstanding history of ulcerative colitis which she states dates back 40 years.  She currently is on Dipentum 250 mg b.i.d..  She last underwent colonoscopy at [facility] in 2015. Biopsies did not show active colitis at that time.  She also has a history of small benign colonic polyps .  She currently is doing reasonably well.  She denies any rectal bleeding.  She occasionally will have some fecal urgency with some loose stools but not on a regular basis.  She occasionally has some discomfort in the right upper quadrant of the abdomen which may be related to fatty foods but is not often or concerns.    MEDICATIONS:       Current Outpatient Medications   Medication Sig Dispense Refill   • Alcohol Swabs (Alcohol Wipes) 70 % Pads Use as directed.     • clindamycin (CLEOCIN) 150 MG capsule      • glucagon 1 MG injection kit As directed     • blood glucose (Precision QID Test) test strip Freestyle lite test strips.  Tests up to four times per day.     • insulin glargine (LANTUS) 100 UNIT/ML vial solution Inject 62 Units into the skin daily.     • insulin glargine 100 UNIT/ML pen-injector Inject 60 Units into the skin.     • Insulin Lispro, 1 Unit Dial, (HumaLOG KwikPen) 100 UNIT/ML pen-injector Inject 16-25 units before meals three times a day     • B-D U/F PEN NEEDLE 31G X 5 MM Misc      • Insulin Syringe 31G X 5/16\" 1 ML Misc Use to inject lantus nightly     • Lancets Thin Misc      • metformin (GLUCOPHAGE) 1000 MG tablet Take 1,000 mg by mouth.     • olsalazine (Dipentum) 250 MG capsule TAKE 1 CAPSULE TWICE A DAY     • Sharps Container (BD GUARDIAN SHARPS ) Misc Use as directed.     • valsartan (DIOVAN) 40 MG tablet Take 40 mg  by mouth daily.       No current facility-administered medications for this visit.       PROBLEM LIST:                There is no problem list on file for this patient.      HISTORIES:    ALLERGIES:   Allergen Reactions   • Contrast Media ANXIETY, DIARRHEA, GI UPSET, HEADACHES, HIVES and NAUSEA   • Iodine HIVES   • Pollen Extract HIVES     Trees, grass, flowers   • Seasonal HIVES     MRI Dye   • Sulfa Antibiotics Other (See Comments)     abnormal liver tests     • Aloe RASH   • Penicillins GI UPSET, NAUSEA, Nausea & Vomiting and VOMITING     No past medical history on file.  No past surgical history on file.  Social History     Tobacco Use   • Smoking status: Never Smoker   • Smokeless tobacco: Never Used      No family history on file.:    REVIEW OF SYSTEMS:    All other systems are reviewed and are negative except as documented in the history of present illness.    PHYSICAL EXAM:    Vital Signs: There were no vitals taken for this visit.  General: The patient is well-developed, well-nourished, in no acute distress, appears stated age.   Neurologic: Alert. Normal mood and affect, normal speech, no gross tremor.  Head: Normocephalic.    LABORATORY DATA:    All pertinent laboratory results were reviewed.        ASSESSMENT:     History of ulcerative colitis  Intermittent mild right upper quadrant discomfort  There are no diagnoses linked to this encounter.    PLAN:       Will schedule the patient for follow-up colonoscopy as she is due.    If patient continues to complain of symptoms of right upper quadrant discomfort will obtain ultrasound of the gallbladder  follow up with MD MENESESN   Instructions provided as documented in the after visit summary.    The patient indicated understanding of the diagnosis and agreed with the plan of care.      A copy of this note was sent to the referring provider, thank you for involving me   in the care of this patient.

## 2024-01-24 ENCOUNTER — MYC MEDICAL ADVICE (OUTPATIENT)
Dept: RHEUMATOLOGY | Facility: CLINIC | Age: 39
End: 2024-01-24
Payer: COMMERCIAL

## 2024-01-25 NOTE — TELEPHONE ENCOUNTER
Sent an email to Isaura with scanned in provider portion of Humira application.    Alicia Willett RPh

## 2024-01-26 NOTE — TELEPHONE ENCOUNTER
FREE DRUG APPLICATION INITIATED    Medication: HUMIRA *CF* PEN 40 MG/0.4ML SC PNKT  Free Drug Program Name:  [facility]  Date Submitted: 1/26/2024  8:59 AM  Phone #: [phone]  Fax #: [phone]  Additional Information: faxed provider portion

## 2024-02-21 NOTE — TELEPHONE ENCOUNTER
Spoke with my thuy today and they state applicaiton is being reviewed. Rep states should have outcome in 1-2 business days but request have been taking longer. Will check back in few days for another update

## 2024-02-27 NOTE — TELEPHONE ENCOUNTER
Spoke with [employer] and they are still reviewing application but they are running behind may take up to 10 business days

## 2024-03-04 NOTE — TELEPHONE ENCOUNTER
[employer] will not approve patient for free drug program. They state patient now has insurance again and has coverage. Patient will need to exhaust copay card and then speak with [employer] about alternative options if exhausted. Please advise how patient should proceed     yes

## 2024-03-05 ENCOUNTER — TELEPHONE (OUTPATIENT)
Dept: RHEUMATOLOGY | Facility: CLINIC | Age: 39
End: 2024-03-05
Payer: COMMERCIAL

## 2024-03-05 DIAGNOSIS — L73.2 HIDRADENITIS SUPPURATIVA: Primary | ICD-10-CM

## 2024-03-05 NOTE — TELEPHONE ENCOUNTER
PA Initiation    Medication: HUMIRA *CF* PEN-CD/UC/HS STARTER 80 MG/0.8ML SC PNKT  Insurance Company: [employer] - Phone [phone] Fax [phone]  Pharmacy Filling the Rx: [facility] - [place], [place] - [address]  Filling Pharmacy Phone:    Filling Pharmacy Fax:    Start Date: 3/5/2024

## 2024-03-05 NOTE — TELEPHONE ENCOUNTER
Patient has been off Humira for HS for around 2 months. Some flaring at this point off treatment. Recommended we repeat the loading dose for Humira. Patient agreeable to this plan (see [employer] messages from 2/28/24). Sent orders for Humira loading dose today.     Instructions: Inject 160 mg subcutaneous (2 x 80 mg pens) on Day 1, then 80 mg (1 x 80 mg) two weeks later on Day 15, then two weeks later on Day 29 continue with maintenance dose of 40 mg once weekly.     Routing to derm liaisons -- can you please make sure this patient is covered for loading dose? If loading dose needs an appeal we could proceed without this and go right to maintenance dose of Humira 40 mg weekly.     Alicia Willett, PharmD  MTM Pharmacist

## 2024-03-05 NOTE — TELEPHONE ENCOUNTER
Left message for patient to discuss denial. Patient now has insurance again and has coverage. Patient will need to exhaust copay card and then speak with [employer] about alternative options if exhausted. [employer]'s number is [phone]

## 2024-03-11 NOTE — TELEPHONE ENCOUNTER
PRIOR AUTHORIZATION DENIED    Medication: HUMIRA *CF* PEN 40 MG/0.4ML SC PNKT  Insurance Company: [employer] - Phone [phone] Fax [phone]  Denial Date:    Denial Reason(s):   Appeal Information: [phone]  Patient Notified:

## 2024-03-20 ENCOUNTER — TELEPHONE (OUTPATIENT)
Dept: DERMATOLOGY | Facility: CLINIC | Age: 39
End: 2024-03-20
Payer: COMMERCIAL

## 2024-03-20 ENCOUNTER — TELEPHONE (OUTPATIENT)
Dept: RHEUMATOLOGY | Facility: CLINIC | Age: 39
End: 2024-03-20
Payer: COMMERCIAL

## 2024-03-20 DIAGNOSIS — L73.2 HIDRADENITIS SUPPURATIVA: Primary | ICD-10-CM

## 2024-03-20 RX ORDER — ADALIMUMAB-ADAZ 40 MG/.4ML
40 INJECTION, SOLUTION SUBCUTANEOUS WEEKLY
Qty: 1.6 ML | Refills: 6 | Status: SHIPPED | OUTPATIENT
Start: 2024-03-20 | End: 2024-04-04

## 2024-03-20 NOTE — TELEPHONE ENCOUNTER
Spoke with patient via [employer] message. Due to insurance restrictions she is comfortable with switching to plan preferred biosimilar for Humira (adalimumab): Hyrimoz (adalimumab-adaz) or Hulio (adalimumab-fkjp) or Hadlima (adalimumab-bwwd).     Sent prescription for Hyrimoz (adalimumab-adaz). Since the patient has been off Humira for >2 months, we will attempt to reload with new biosimilar, Hyrimoz.     Instructions for Hyrimoz: Inject 160 mg subcutaneous (2 x 80 mg pens) on Day 1, then 80 mg (1 x 80 mg) two weeks later on Day 15, then two weeks later on Day 29 continue with maintenance dose of 40 mg once weekly.     Alicia Willett, PharmD  MT Pharmacist  [facility]

## 2024-03-20 NOTE — TELEPHONE ENCOUNTER
PA Initiation    Medication: ADALIMUMAB-ADAZ 40 MG/0.4ML SC SOAJ  Insurance Company: [employer] - Phone [phone] Fax [phone]  Pharmacy Filling the Rx: [facility] - [place], [place] - [address]  Filling Pharmacy Phone:    Filling Pharmacy Fax:    Start Date: 3/20/2024

## 2024-03-26 NOTE — TELEPHONE ENCOUNTER
Prior Authorization Approval    Medication: ADALIMUMAB-ADAZ 40 MG/0.4ML SC SOAJ  Authorization Effective Date: 3/20/2024  Authorization Expiration Date:    Approved Dose/Quantity: 6 for 28 days  Reference #:     Insurance Company: [employer] - Phone [phone] Fax [phone]  Expected CoPay: $ 0  CoPay Card Available: No    Financial Assistance Needed: Copay card on file  Which Pharmacy is filling the prescription: [facility] - [place], [place] - [address]  Pharmacy Notified: yes  Patient Notified: yes'

## 2024-03-29 ENCOUNTER — TELEPHONE (OUTPATIENT)
Dept: DERMATOLOGY | Facility: CLINIC | Age: 39
End: 2024-03-29
Payer: COMMERCIAL

## 2024-03-29 NOTE — TELEPHONE ENCOUNTER
PA Needed    Medication: Hyrimoz-chrohns/uc starter 80 soaj  QTY/DS: 2.4 per 28 days  NEW INS: Yes  Insurance Company: [employer] Filling the Rx:  Milvia Elder  PA :    Date of last fill:     Pt restarting with starter dose  - maintenance dose is going through ins but starter dose is rejecting    I tried all 4 ndc's that come as alternatives for the starter dose HYRIMOZ-CROHNS/UC STARTER P 80 SOAJ  - all 4 ndc's rejecting

## 2024-04-04 ENCOUNTER — VIRTUAL VISIT (OUTPATIENT)
Dept: DERMATOLOGY | Facility: CLINIC | Age: 39
End: 2024-04-04
Payer: COMMERCIAL

## 2024-04-04 DIAGNOSIS — L73.2 HIDRADENITIS SUPPURATIVA: ICD-10-CM

## 2024-04-04 PROCEDURE — 99214 OFFICE O/P EST MOD 30 MIN: CPT | Performed by: DERMATOLOGY

## 2024-04-04 RX ORDER — RESORCINOL
POWDER (GRAM) MISCELLANEOUS
Qty: 30 G | Refills: 11 | Status: SHIPPED | OUTPATIENT
Start: 2024-04-04

## 2024-04-04 RX ORDER — ADALIMUMAB-ADAZ 40 MG/.4ML
40 INJECTION, SOLUTION SUBCUTANEOUS WEEKLY
Qty: 1.6 ML | Refills: 11 | OUTPATIENT
Start: 2024-04-04 | End: 2024-04-04

## 2024-04-04 RX ORDER — ADALIMUMAB-ADAZ 40 MG/.4ML
40 INJECTION, SOLUTION SUBCUTANEOUS WEEKLY
Qty: 1.6 ML | Refills: 11 | Status: SHIPPED | OUTPATIENT
Start: 2024-04-04

## 2024-04-04 ASSESSMENT — PAIN SCALES - GENERAL: PAINLEVEL: MODERATE PAIN (4)

## 2024-04-04 NOTE — PROGRESS NOTES
[facility] Dermatology Note  Encounter Date: Apr 4, 2024  Telephone (). Location of teledermatologist: [facility] DERMATOLOGY [facility]IC [place]. Start time: 8:52pm End time: 9:10pm        Dermatology Problem List:  1. Hidradenitis suppurativa of the bilateral inguinal folds, Scott stage II  - current tx: Humira (started 4/19/19), Resorcinol cream 2x daily for flares  - Excision of persistent nodules of right and left medial thighs 6/9/2021  - past tx: dapsone 100 mg daily (suspended on 11/1/2018 due to severe headaches), spironolactone 100mg daily (discontinued due to acne flare), BPO 10% wash, ILK 10 9/12/19, Hibiclens (stopped 2021)  - COVID-19 vaccine and 2 boosters (as of 8/11/22)   - Quantiferon neg 7/26/22; 3/27/24  - TB risk assessment 8/3/2023  2. Hypothyroidism  - Levothyroxine 100 mcg  SH:  recently passed away 3/2023 unexpectedly in accident    ____________________________________________    Assessment & Plan:   # Hidradenitis suppurativa of the bilateral inguinal folds, Scott stage II, stable  - Excision of persistent nodules of right and left medial thighs 6/9/202, excision sites have healed well   - Continue Humira. Will ensure patient has refills for the next year.  - Continue Resorcinol cream 2x daily as needed for flares  - Discussed recommended vaccine               - Pneumonia vaccine              - Shingles vaccine x2      Follow-up: 12 months        Des Ventura MD, FAAD, FACP     Departments of Internal Medicine and Dermatology  [facility]  [phone]    .____________________________________________    CC: Follow-up HS    HPI:  Ms. Sheri Pace is a(n) 38 year old female who presents today as a return patient for  HS     Last seen 08/3/2023 by me.     Her insurance stopped covering Humira in Nov. HAs had more flares since. She is now starting a biosimiliar with a reload.     No change sin menstrual    Recently started  getting night sweats.   HAs had extensive work up with no findings    Patient is otherwise feeling well, without additional skin concerns.    HS Nurse Assessment        4/8/2021     3:12 PM 8/11/2022     4:10 PM 8/3/2023     2:10 PM   Nurse Assessment Data   Over the past 30 days how many old lesions flared back up?  0 2   Over the past 30 days how many new lesions did you get? 2 2 2   Over the past week, how many dressing changes do you do each day? 2 0 0   Over the past week, has your wound drainage been: Mild None Mild   Rate your HS overall from 0 - 10 (0 = no disease, 10 = worst) over the past week:  5 5 4   Rate your pain score from 0 - 10 (0 = no disease, 10 = worst) for the most painful/symptomatic lesion in the past week:  5 - Moderate Pain 8 4   Over the past week, how much has HS influenced your quality of life? slightly slightly slightly        HS Nurse Assessment        8/11/2022     4:10 PM 8/3/2023     2:10 PM 4/4/2024     3:22 PM   Nurse Assessment Data   Over the past 30 days how many old lesions flared back up? 0 2 1   Over the past 30 days how many new lesions did you get? 2 2 3   Over the past week, how many dressing changes do you do each day? 0 0 0   Over the past week, has your wound drainage been: None Mild Mild   Rate your HS overall from 0 - 10 (0 = no disease, 10 = worst) over the past week:  5 4 5   Rate your pain score from 0 - 10 (0 = no disease, 10 = worst) for the most painful/symptomatic lesion in the past week:  8 4 4   Over the past week, how much has HS influenced your quality of life? slightly slightly slightly     Medications:  Current Outpatient Medications   Medication Sig Dispense Refill    Adalimumab-adaz 40 MG/0.4ML SOAJ Inject 40 mg Subcutaneous once a week 1.6 mL 6    Adalimumab-adaz 80 MG/0.8ML SOAJ Inject 160 mg Subcutaneous See Admin Instructions Inject 160 mg subcutaneous (2 x 80 mg pens) on Day 1, then 80 mg (1 x 80 mg pen) two weeks later on Day 15, then 40 mg (1 x  40 mg pen) two weeks later on Day 29, then continue with 40 mg once weekly thereafter (see separate order) 2.4 mL 0    clobetasol (TEMOVATE) 0.05 % external cream Apply topically 2 times daily 45 g 0    escitalopram (LEXAPRO) 20 MG tablet Take 20 mg by mouth daily      levothyroxine (SYNTHROID/LEVOTHROID) 112 MCG tablet Take 112 mcg by mouth daily      MOUNJARO 7.5 MG/0.5ML pen Inject 7.5 mg Subcutaneous once a week      norgestim-eth estrad triphasic (ORTHO TRI-CYCLEN, TRI-SPRINTEC) 0.18/0.215/0.25 MG-35 MCG per tablet 0.035 mg      Resorcinol POWD Resorcinol 15% in vanicream twice a ay for 30 days for problematic areas and then twice a day as needed 30 g 11     Current Facility-Administered Medications   Medication Dose Route Frequency Provider Last Rate Last Admin    triamcinolone acetonide (KENALOG-10) injection 40 mg  40 mg Intradermal Once Des Ventura MD            CT chest abd/poelvis nwl.     CC Des Ventura MD  [address] [place] [address] on close of this encounter.

## 2024-04-04 NOTE — LETTER
4/4/2024       RE: Sheri Pace  [address]  [place] [address]     Dear Colleague,    Thank you for referring your patient, Sheri Pace, to the [facility] DERMATOLOGY [facility] [place] at [facility]. Please see a copy of my visit note below.    [facility] Dermatology Note  Encounter Date: Apr 4, 2024  Telephone (). Location of teledermatologist: [facility]ERMATOLOGY [facility] [place]. Start time: 8:52pm End time: 9:10pm        Dermatology Problem List:  1. Hidradenitis suppurativa of the bilateral inguinal folds, Scott stage II  - current tx: Humira (started 4/19/19), Resorcinol cream 2x daily for flares  - Excision of persistent nodules of right and left medial thighs 6/9/2021  - past tx: dapsone 100 mg daily (suspended on 11/1/2018 due to severe headaches), spironolactone 100mg daily (discontinued due to acne flare), BPO 10% wash, ILK 10 9/12/19, Hibiclens (stopped 2021)  - COVID-19 vaccine and 2 boosters (as of 8/11/22)   - Quantiferon neg 7/26/22; 3/27/24  - TB risk assessment 8/3/2023  2. Hypothyroidism  - Levothyroxine 100 mcg  SH:  recently passed away 3/2023 unexpectedly in accident    ____________________________________________    Assessment & Plan:   # Hidradenitis suppurativa of the bilateral inguinal folds, Scott stage II, stable  - Excision of persistent nodules of right and left medial thighs 6/9/202, excision sites have healed well   - Continue Humira. Will ensure patient has refills for the next year.  - Continue Resorcinol cream 2x daily as needed for flares  - Discussed recommended vaccine               - Pneumonia vaccine              - Shingles vaccine x2      Follow-up: 12 months        Des Ventura MD, FAAD, FACP     Departments of Internal Medicine and Dermatology  [facility]  [phone]    .____________________________________________    CC: Follow-up  HS    HPI:  Ms. Sheri Pace is a(n) 38 year old female who presents today as a return patient for  HS     Last seen 08/3/2023 by me.     Her insurance stopped covering Humira in Nov. HAs had more flares since. She is now starting a biosimiliar with a reload.     No change sin menstrual    Recently started getting night sweats.   HAs had extensive work up with no findings    Patient is otherwise feeling well, without additional skin concerns.    HS Nurse Assessment        4/8/2021     3:12 PM 8/11/2022     4:10 PM 8/3/2023     2:10 PM   Nurse Assessment Data   Over the past 30 days how many old lesions flared back up?  0 2   Over the past 30 days how many new lesions did you get? 2 2 2   Over the past week, how many dressing changes do you do each day? 2 0 0   Over the past week, has your wound drainage been: Mild None Mild   Rate your HS overall from 0 - 10 (0 = no disease, 10 = worst) over the past week:  5 5 4   Rate your pain score from 0 - 10 (0 = no disease, 10 = worst) for the most painful/symptomatic lesion in the past week:  5 - Moderate Pain 8 4   Over the past week, how much has HS influenced your quality of life? slightly slightly slightly        HS Nurse Assessment        8/11/2022     4:10 PM 8/3/2023     2:10 PM 4/4/2024     3:22 PM   Nurse Assessment Data   Over the past 30 days how many old lesions flared back up? 0 2 1   Over the past 30 days how many new lesions did you get? 2 2 3   Over the past week, how many dressing changes do you do each day? 0 0 0   Over the past week, has your wound drainage been: None Mild Mild   Rate your HS overall from 0 - 10 (0 = no disease, 10 = worst) over the past week:  5 4 5   Rate your pain score from 0 - 10 (0 = no disease, 10 = worst) for the most painful/symptomatic lesion in the past week:  8 4 4   Over the past week, how much has HS influenced your quality of life? slightly slightly slightly     Medications:  Current Outpatient Medications   Medication Sig  Dispense Refill    Adalimumab-adaz 40 MG/0.4ML SOAJ Inject 40 mg Subcutaneous once a week 1.6 mL 6    Adalimumab-adaz 80 MG/0.8ML SOAJ Inject 160 mg Subcutaneous See Admin Instructions Inject 160 mg subcutaneous (2 x 80 mg pens) on Day 1, then 80 mg (1 x 80 mg pen) two weeks later on Day 15, then 40 mg (1 x 40 mg pen) two weeks later on Day 29, then continue with 40 mg once weekly thereafter (see separate order) 2.4 mL 0    clobetasol (TEMOVATE) 0.05 % external cream Apply topically 2 times daily 45 g 0    escitalopram (LEXAPRO) 20 MG tablet Take 20 mg by mouth daily      levothyroxine (SYNTHROID/LEVOTHROID) 112 MCG tablet Take 112 mcg by mouth daily      MOUNJARO 7.5 MG/0.5ML pen Inject 7.5 mg Subcutaneous once a week      norgestim-eth estrad triphasic (ORTHO TRI-CYCLEN, TRI-SPRINTEC) 0.18/0.215/0.25 MG-35 MCG per tablet 0.035 mg      Resorcinol POWD Resorcinol 15% in vanicream twice a ay for 30 days for problematic areas and then twice a day as needed 30 g 11     Current Facility-Administered Medications   Medication Dose Route Frequency Provider Last Rate Last Admin    triamcinolone acetonide (KENALOG-10) injection 40 mg  40 mg Intradermal Once Des Ventura MD            CT chest abd/poelvis nwl.     CC Des Ventura MD  [address] [place] [address] on close of this encounter.

## 2024-04-04 NOTE — NURSING NOTE
Dermatology Rooming Note    Sheri S Link's goals for this visit include:   Chief Complaint   Patient presents with    Derm Problem     HS follow up.  Has been off Humira since Nov.  Scheduled to recvieve new medication on Tuesday.  Current flare in the groin area      Lian Ruelas CMA

## 2024-04-05 ENCOUNTER — TELEPHONE (OUTPATIENT)
Dept: DERMATOLOGY | Facility: CLINIC | Age: 39
End: 2024-04-05
Payer: COMMERCIAL

## 2024-04-05 NOTE — TELEPHONE ENCOUNTER
Patient confirmed scheduled appointment:  Date: 03/13/25  Time: 4:30 PM  Visit type: Return HS  Provider: Dr. Ventura  Location: Virtual

## 2024-04-05 NOTE — PATIENT INSTRUCTIONS
Sent resorcinol to [place] pharmacy  [facility]  Address: [address] [place] [address], [place] [address]  Phone: [phone]    Discuss pneumonia and shingles vaccines with kisha care

## 2024-07-14 ENCOUNTER — HEALTH MAINTENANCE LETTER (OUTPATIENT)
Age: 39
End: 2024-07-14

## 2024-12-19 ENCOUNTER — VIRTUAL VISIT (OUTPATIENT)
Dept: PHARMACY | Facility: CLINIC | Age: 39
End: 2024-12-19
Attending: DERMATOLOGY
Payer: COMMERCIAL

## 2024-12-19 DIAGNOSIS — L73.2 HIDRADENITIS SUPPURATIVA: ICD-10-CM

## 2024-12-19 RX ORDER — ADALIMUMAB-ADAZ 40 MG/.4ML
40 INJECTION, SOLUTION SUBCUTANEOUS WEEKLY
Qty: 4.8 ML | Refills: 4 | Status: SHIPPED | OUTPATIENT
Start: 2024-12-19

## 2024-12-19 RX ORDER — LEVOTHYROXINE SODIUM 100 UG/1
100 TABLET ORAL
COMMUNITY
Start: 2024-11-25

## 2024-12-19 RX ORDER — NORGESTIMATE AND ETHINYL ESTRADIOL 0.25-0.035
1 KIT ORAL DAILY
COMMUNITY
Start: 2024-11-22

## 2024-12-19 NOTE — PROGRESS NOTES
Medication Therapy Management (MTM) Encounter    ASSESSMENT:                            Hidradenitis suppurativa:   Well controlled on adalimumab 40 mg weekly. Tolerating with side effects. Would benefit from continuing current regimen. Coverage approved for biosimilar. She will monitor if HS flares occur less frequently with changes in oral contraceptive to extended-cycle dosing.   Pre-biologic labs: previously completed; Quant TB up-to-date   Vaccines: Avoid live vaccines. Reviewed she is indicated to receive: Covid-19 vaccine (2024-25), annual influenza, Shingrix (2 dose series), Prevnar 20, and Tetanus booster (>10 years since booster). Agrees to receive Tetanus booster, and will consider pneumonia and shingles vaccines with primary.     PLAN:                            Continue Hyrimoz (adalimumab-adaz) 40 mg once weekly   Refills available at [facility] (P#: [phone])     Vaccine recommendations:   Tetanus booster (due every 10 years)   Prevnar 20 (for prevention of pneumonia) Shingrix vaccine (for shingles prevention)     Follow-up: with me (Alicia Willett [facility]) for an MTM follow up appointment by phone in around 6 months (6/19/25)    SUBJECTIVE/OBJECTIVE:                          Sheri Pace is a 39 year old female called for a follow-up visit.      Reason for visit: Adalimumab follow-up     Medication Adherence/Access:   Adalimumab coverage:   - Insurance prefers biosimilars. On Hyrimoz (adalimumab-adaz). No PA required.     Hidradenitis suppurativa:   - Hyrimoz (adalimumab-adaz) 40 mg weekly   - clobetasol 0.05% cream as needed (rarely uses for injection site irritation with Humira)   - Oral contraceptive (norgestim-eth estrad triphasic) daily (skipping placebo, once every 3 months)   - Acute flare plans:   - Resorcinol 15% cream twice daily as needed for flares (fills through [facility])    Side effects: None.    Reports HS has been overall well controlled on adalimumab; finds  biosimilar just as effective as brand Humira. Continues to get 1 small flare around once month but this has been tolerable & is a big improvement from baseline.      PCP recently switched oral contraceptive to skip placebo x 2 months & will now get menstrual cycle every 3 months. She is not sure if her HS generally flares with menses but will monitor if this improves with change in this.     Adalimumab timeline:   - 1st started: 04/2019  - Off for at least 1-2 months at start of 2024  - Restarted biosimilar with repeated load: ~04/2024    Specialist: Dr. Des Ventura MD, Dermatology. Last visit on 04/04/2024. The following was recommended:   - Excision of persistent nodules of right and left medial thighs 6/9/202, excision sites have healed well   - Continue Humira. Will ensure patient has refills for the next year.  - Continue Resorcinol cream 2x daily as needed for flares  - Discussed recommended vaccine     Previous treatments:   - dapsone 100 mg daily (suspended on 11/1/2018 due to severe headaches)  - spironolactone 100mg daily (discontinued due to acne flare)  - Topical therapies: BPO 10% wash, Hibiclens (stopped 2021)   - ILK injection (9/12/19)  - Excision of persistent nodules of right and left medial thighs 6/9/2021     Pre-Biologic Screening:   Hep B Surface Antibody Positive (3/27/24), indicates immunity from HBV    Hep B Core Antibody  Non-reactive (3/16/19)    Hep B Surface Antigen Non-reactive (3/16/19)    Hep C Antibody  Non-reactive (3/16/19)    HIV Antigen Antibody Non-reactive (3/16/19)    Quantiferon TB Gold Negative (3/27/24). No risk factors for TB infection, does not need repeat screening at this time.      She will consider whether she wants to receive Shingrix & Pneumonia vaccines with PCP  Immunization History   Covid-19 vaccine (2024-25 version)  Due to receive   Influenza (annual) Due for annual , avoid live FluMist   Pneumococcal Never received. Consider Prevnar 20.   Tetanus/Tdap  (Last dose: 10/20/14)  Due for booster   Shingrix Never received. Consider 2-dose Shingrix series.     Allergies/ADRs: Reviewed in chart  Past Medical History: Reviewed in chart  Tobacco: She reports that she has never smoked. She has never used smokeless tobacco.  ----------------    I spent 15 minutes with this patient today. All changes were made via collaborative practice agreement with Des Ventura. A copy of the visit note was provided to the patient's provider(s).    A summary of these recommendations was sent via [employer].    Alicia Willett, PharmD  Medication Therapy Management Pharmacist   [facility]     Telemedicine Visit Details  The patient's medications can be safely assessed via a telemedicine encounter.  Type of service:  Telephone visit  Originating Location (pt. Location): Home    Distant Location (provider location):  On-site  Start Time:  9:30am  End Time:  9:45am     Medication Therapy Recommendations  Hidradenitis suppurativa   1 Rationale: Preventive therapy - Needs additional medication therapy - Indication   Recommendation: Order Vaccine - TETANUS-DIPHTH-ACELL PERTUSSIS - Patient agrees to receive updated Tdap booster. Will consider Shingrix & Prevnar 20.   Status: Patient Agreed - Adherence/Education   Identified Date: 12/19/2024 Completed Date: 12/19/2024

## 2024-12-19 NOTE — PATIENT INSTRUCTIONS
"Recommendations from today's MTM visit:                                                      Continue Hyrimoz (adalimumab-adaz) 40 mg once weekly   Refills available at [facility] (P#: [phone])     Vaccine recommendations:   Tetanus booster (due every 10 years)   Prevnar 20 (for prevention of pneumonia) Shingrix vaccine (for shingles prevention)     Follow-up: with me (Alicia Willett, [facility]) for an MTM follow up appointment by phone in around 6 months (6/19/25)    It was great speaking with you today.  I value your experience and would be very thankful for your time in providing feedback in our clinic survey. In the next few days, you may receive an email or text message from [employer] with a link to a survey related to your  clinical pharmacist.\"     To schedule another MTM appointment, please call the clinic directly or you may call the MTM scheduling line at [phone] or toll-free at [phone].     My Clinical Pharmacist's contact information:                                                      Please feel free to contact me with any questions or concerns you have.      Alicia Willett, PharmD  MTM Pharmacist  [facility]   "

## 2024-12-19 NOTE — Clinical Note
Continues on Humira biosimilar. Doing well since this was restarted in April 2024 after 2 month gap in treatment.   Alicia

## 2025-03-13 ENCOUNTER — VIRTUAL VISIT (OUTPATIENT)
Dept: DERMATOLOGY | Facility: CLINIC | Age: 40
End: 2025-03-13
Attending: DERMATOLOGY
Payer: COMMERCIAL

## 2025-03-13 DIAGNOSIS — L73.2 HIDRADENITIS SUPPURATIVA: ICD-10-CM

## 2025-03-13 RX ORDER — ADALIMUMAB-ADAZ 40 MG/.4ML
40 INJECTION, SOLUTION SUBCUTANEOUS WEEKLY
Qty: 1.6 ML | Refills: 11 | OUTPATIENT
Start: 2025-03-13 | End: 2025-03-13

## 2025-03-13 RX ORDER — ADALIMUMAB-ADAZ 40 MG/.4ML
40 INJECTION, SOLUTION SUBCUTANEOUS WEEKLY
Qty: 1.6 ML | Refills: 11 | OUTPATIENT
Start: 2025-03-13

## 2025-03-13 ASSESSMENT — PAIN SCALES - GENERAL: PAINLEVEL_OUTOF10: NO PAIN (0)

## 2025-03-13 NOTE — PROGRESS NOTES
[facility] Dermatology Note  Encounter Date: Mar 13, 2025  Telephone ([phone]). Location of teledermatologist: [facility] [place]. Start time: 8:24pm. End time: 8:35pm    Virtual Visit Details  Type of service:  Telephone Visit   Phone call duration: 11 minutes   Originating Location (pt. Location): Home    Distant Location (provider location):  On-site  Telephone visit completed due to the patient did not consent to a video visit.    Dermatology Problem List:  1. Hidradenitis suppurativa of the bilateral inguinal folds, Scott stage II  - current tx: Humira (started 4/19/19), Resorcinol cream 2x daily for flares  - Excision of persistent nodules of right and left medial thighs 6/9/2021  - past tx: dapsone 100 mg daily (suspended on 11/1/2018 due to severe headaches), spironolactone 100mg daily (discontinued due to acne flare), BPO 10% wash, ILK 10 9/12/19, Hibiclens (stopped 2021)  - COVID-19 vaccine and 2 boosters (as of 8/11/22)   - Quantiferon neg 7/26/22; 3/27/24    2. Hypothyroidism  - Levothyroxine 100 mcg    SH:  passed away 3/2023 unexpectedly in accident  ____________________________________________    Assessment & Plan:   # Hidradenitis suppurativa of the bilateral inguinal folds, Scott stage II, stable  - Excision of persistent nodules of right and left medial thighs 6/9/202, excision sites have healed well   Maintenance/Prevention Plan:  - Continue adalimumab-adaz. Will ensure patient has refills for the next year.  - Discussed recommended vaccine               - Pneumonia vaccine              - Shingles vaccine x2  Flare plan  - Continue Resorcinol cream 2x daily as needed for flares   The longitudinal plan of care for the diagnosis(es)/condition(s) as documented were addressed during this visit. Due to the added complexity in care, I will continue to support Sheri in the subsequent management and with ongoing continuity of  care.    Follow-up: 12 months    Des Ventura MD, FAAD, FACP     Departments of Internal Medicine and Dermatology  [facility]    ____________________________________________    CC: Derm Problem (Sheri is being called for an HS follow-up, states condition has remained the same since last seen.)    HPI:  Ms. Sheri Pace is a(n) 39 year old female who presents today for follow-up  for HS.    Last seen in dermatology by me virtually on 4/4/24:  # Hidradenitis suppurativa of the bilateral inguinal folds, Scott stage II, stable  - Excision of persistent nodules of right and left medial thighs 6/9/202, excision sites have healed well   - Continue Humira. Will ensure patient has refills for the next year.  - Continue Resorcinol cream 2x daily as needed for flares  - Discussed recommended vaccine               - Pneumonia vaccine              - Shingles vaccine x2     Follow-up: 12 months    Last seen by [facility] pharmacist Alicia Willett virtually on 12/19/24:  Hidradenitis suppurativa:   Well controlled on adalimumab 40 mg weekly. Tolerating with side effects. Would benefit from continuing current regimen. Coverage approved for biosimilar. She will monitor if HS flares occur less frequently with changes in oral contraceptive to extended-cycle dosing.   Pre-biologic labs: previously completed; Quant TB up-to-date   Vaccines: Avoid live vaccines. Reviewed she is indicated to receive: Covid-19 vaccine (2024-25), annual influenza, Shingrix (2 dose series), Prevnar 20, and Tetanus booster (>10 years since booster). Agrees to receive Tetanus booster, and will consider pneumonia and shingles vaccines with primary.     Continue Hyrimoz (adalimumab-adaz) 40 mg once weekly   Refills available at [facility] (P#: [phone])      Vaccine recommendations:   Tetanus booster (due every 10 years)   Prevnar 20 (for prevention of pneumonia) Shingrix vaccine (for shingles prevention)      No changes. No recent flares. Happy with current management. No recent infections    - No symptoms or history concerning for TB    No known TB exposure    No recent travel to TB endemic area  No chronic cough, hemoptysis, major weight changes   Not a healthcare worker   Not homeless and has not worked in a homeless shelter   + night sweats for over a year with work up including quntiferon that wa sneg. No changes,.     Patient is otherwise feeling well, without additional skin concerns.    HS Nurse Assessment        8/3/2023     2:10 PM 4/4/2024     3:22 PM 3/13/2025     4:22 PM   Nurse Assessment Data   Over the past 30 days how many old lesions flared back up? 2 1 1   Over the past 30 days how many new lesions did you get? 2 3 1   Over the past week, how many dressing changes do you do each day? 0 0 0   Over the past week, has your wound drainage been: Mild Mild Mild   Rate your HS overall from 0 - 10 (0 = no disease, 10 = worst) over the past week:  4 5 2   Rate your pain score from 0 - 10 (0 = no disease, 10 = worst) for the most painful/symptomatic lesion in the past week:  4 4 1   Over the past week, how much has HS influenced your quality of life? slightly slightly not at all     Medications:  Current Outpatient Medications   Medication Sig Dispense Refill    adalimumab-adaz (HYRIMOZ) 40 MG/0.4ML auto-injector kit Inject 0.4 mLs (40 mg) subcutaneously once a week. 4.8 mL 4    escitalopram (LEXAPRO) 20 MG tablet Take 20 mg by mouth daily      levothyroxine (SYNTHROID/LEVOTHROID) 100 MCG tablet Take 100 mcg by mouth every morning (before breakfast).      MONO-LINYAH 0.25-35 MG-MCG tablet Take 1 tablet by mouth daily.      MOUNJARO 7.5 MG/0.5ML pen Inject 7.5 mg Subcutaneous once a week      Resorcinol POWD Resorcinol 15% in vanicream twice a ay for 30 days for problematic areas and then twice a day as needed 30 g 11    clobetasol (TEMOVATE) 0.05 % external cream Apply topically 2 times daily (Patient not  taking: Reported on 3/13/2025) 45 g 0     Current Facility-Administered Medications   Medication Dose Route Frequency Provider Last Rate Last Admin    triamcinolone acetonide (KENALOG-10) injection 40 mg  40 mg Intradermal Once Des Ventura MD            CC Des Ventura MD  [address] [place] [address] on close of this encounter.

## 2025-03-13 NOTE — LETTER
3/13/2025       RE: Sheri Pace  [address]  [place] [address]     Dear Colleague,    Thank you for referring your patient, Sheri Pace, to the [facility] [place] [facility]. Please see a copy of my visit note below.    [facility] Dermatology Note  Encounter Date: Mar 13, 2025  Telephone ([phone]). Location of teledermatologist: [facility] [place]. Start time: 8:24pm. End time: 8:35pm    Virtual Visit Details  Type of service:  Telephone Visit   Phone call duration: 11 minutes   Originating Location (pt. Location): Home    Distant Location (provider location):  On-site  Telephone visit completed due to the patient did not consent to a video visit.    Dermatology Problem List:  1. Hidradenitis suppurativa of the bilateral inguinal folds, Scott stage II  - current tx: Humira (started 4/19/19), Resorcinol cream 2x daily for flares  - Excision of persistent nodules of right and left medial thighs 6/9/2021  - past tx: dapsone 100 mg daily (suspended on 11/1/2018 due to severe headaches), spironolactone 100mg daily (discontinued due to acne flare), BPO 10% wash, ILK 10 9/12/19, Hibiclens (stopped 2021)  - COVID-19 vaccine and 2 boosters (as of 8/11/22)   - Quantiferon neg 7/26/22; 3/27/24    2. Hypothyroidism  - Levothyroxine 100 mcg    SH:  passed away 3/2023 unexpectedly in accident  ____________________________________________    Assessment & Plan:   # Hidradenitis suppurativa of the bilateral inguinal folds, Scott stage II, stable  - Excision of persistent nodules of right and left medial thighs 6/9/202, excision sites have healed well   Maintenance/Prevention Plan:  - Continue adalimumab-adaz. Will ensure patient has refills for the next year.  - Discussed recommended vaccine               - Pneumonia vaccine              - Shingles vaccine x2  Flare plan  -  Continue Resorcinol cream 2x daily as needed for flares   The longitudinal plan of care for the diagnosis(es)/condition(s) as documented were addressed during this visit. Due to the added complexity in care, I will continue to support Sheri in the subsequent management and with ongoing continuity of care.    Follow-up: 12 months    Des Ventura MD, FAAD, FACP     Departments of Internal Medicine and Dermatology  [facility]    ____________________________________________    CC: Derm Problem (Sheri is being called for an HS follow-up, states condition has remained the same since last seen.)    HPI:  Ms. Sheri Pace is a(n) 39 year old female who presents today for follow-up  for HS.    Last seen in dermatology by me virtually on 4/4/24:  # Hidradenitis suppurativa of the bilateral inguinal folds, Scott stage II, stable  - Excision of persistent nodules of right and left medial thighs 6/9/202, excision sites have healed well   - Continue Humira. Will ensure patient has refills for the next year.  - Continue Resorcinol cream 2x daily as needed for flares  - Discussed recommended vaccine               - Pneumonia vaccine              - Shingles vaccine x2     Follow-up: 12 months    Last seen by [facility] pharmacist Alicia Willett virtually on 12/19/24:  Hidradenitis suppurativa:   Well controlled on adalimumab 40 mg weekly. Tolerating with side effects. Would benefit from continuing current regimen. Coverage approved for biosimilar. She will monitor if HS flares occur less frequently with changes in oral contraceptive to extended-cycle dosing.   Pre-biologic labs: previously completed; Quant TB up-to-date   Vaccines: Avoid live vaccines. Reviewed she is indicated to receive: Covid-19 vaccine (2024-25), annual influenza, Shingrix (2 dose series), Prevnar 20, and Tetanus booster (>10 years since booster). Agrees to receive Tetanus booster, and will consider pneumonia and shingles  vaccines with primary.     Continue Hyrimoz (adalimumab-adaz) 40 mg once weekly   Refills available at [facility] (P#: [phone])      Vaccine recommendations:   Tetanus booster (due every 10 years)   Prevnar 20 (for prevention of pneumonia) Shingrix vaccine (for shingles prevention)     No changes. No recent flares. Happy with current management. No recent infections    - No symptoms or history concerning for TB    No known TB exposure    No recent travel to TB endemic area  No chronic cough, hemoptysis, major weight changes   Not a healthcare worker   Not homeless and has not worked in a homeless shelter   + night sweats for over a year with work up including quntiferon that wa sneg. No changes,.     Patient is otherwise feeling well, without additional skin concerns.    HS Nurse Assessment        8/3/2023     2:10 PM 4/4/2024     3:22 PM 3/13/2025     4:22 PM   Nurse Assessment Data   Over the past 30 days how many old lesions flared back up? 2 1 1   Over the past 30 days how many new lesions did you get? 2 3 1   Over the past week, how many dressing changes do you do each day? 0 0 0   Over the past week, has your wound drainage been: Mild Mild Mild   Rate your HS overall from 0 - 10 (0 = no disease, 10 = worst) over the past week:  4 5 2   Rate your pain score from 0 - 10 (0 = no disease, 10 = worst) for the most painful/symptomatic lesion in the past week:  4 4 1   Over the past week, how much has HS influenced your quality of life? slightly slightly not at all     Medications:  Current Outpatient Medications   Medication Sig Dispense Refill     adalimumab-adaz (HYRIMOZ) 40 MG/0.4ML auto-injector kit Inject 0.4 mLs (40 mg) subcutaneously once a week. 4.8 mL 4     escitalopram (LEXAPRO) 20 MG tablet Take 20 mg by mouth daily       levothyroxine (SYNTHROID/LEVOTHROID) 100 MCG tablet Take 100 mcg by mouth every morning (before breakfast).       MONO-LINYAH 0.25-35 MG-MCG tablet Take 1 tablet by  mouth daily.       MOUNJARO 7.5 MG/0.5ML pen Inject 7.5 mg Subcutaneous once a week       Resorcinol POWD Resorcinol 15% in vanicream twice a ay for 30 days for problematic areas and then twice a day as needed 30 g 11     clobetasol (TEMOVATE) 0.05 % external cream Apply topically 2 times daily (Patient not taking: Reported on 3/13/2025) 45 g 0     Current Facility-Administered Medications   Medication Dose Route Frequency Provider Last Rate Last Admin     triamcinolone acetonide (KENALOG-10) injection 40 mg  40 mg Intradermal Once Des Ventura MD            CC Des Ventura MD  [address] [place] [address] on close of this encounter.       Again, thank you for allowing me to participate in the care of your patient.      Sincerely,    Des Ventura MD

## 2025-03-13 NOTE — NURSING NOTE
Dermatology Rooming Note    Sheri S Link's goals for this visit include:   Chief Complaint   Patient presents with    Derm Problem     Sheri is being called for an HS follow-up, states condition has remained the same since last seen.     Mehul Freeman, EMT

## 2025-03-13 NOTE — PATIENT INSTRUCTIONS
MAINTENANCE PLAN  - Continue adalimumab-adaz 40mg weeky  - Recommended vaccines              - Pneumonia vaccine              - Shingles vaccine x2    Follow up: 1 year via phone    ___________________________________________________________

## 2025-07-19 ENCOUNTER — HEALTH MAINTENANCE LETTER (OUTPATIENT)
Age: 40
End: 2025-07-19

## 2025-08-18 ENCOUNTER — TELEPHONE (OUTPATIENT)
Dept: PHARMACY | Facility: CLINIC | Age: 40
End: 2025-08-18
Payer: COMMERCIAL

## (undated) RX ORDER — TRIAMCINOLONE ACETONIDE 40 MG/ML
INJECTION, SUSPENSION INTRA-ARTICULAR; INTRAMUSCULAR
Status: DISPENSED
Start: 2018-07-26

## (undated) RX ORDER — BUPIVACAINE HYDROCHLORIDE AND EPINEPHRINE 5; 5 MG/ML; UG/ML
INJECTION, SOLUTION EPIDURAL; INTRACAUDAL; PERINEURAL
Status: DISPENSED
Start: 2021-06-09